# Patient Record
Sex: FEMALE | Race: WHITE | NOT HISPANIC OR LATINO | Employment: FULL TIME | URBAN - METROPOLITAN AREA
[De-identification: names, ages, dates, MRNs, and addresses within clinical notes are randomized per-mention and may not be internally consistent; named-entity substitution may affect disease eponyms.]

---

## 2021-10-22 ENCOUNTER — HOSPITAL ENCOUNTER (EMERGENCY)
Facility: MEDICAL CENTER | Age: 36
End: 2021-10-22
Attending: EMERGENCY MEDICINE
Payer: COMMERCIAL

## 2021-10-22 ENCOUNTER — APPOINTMENT (OUTPATIENT)
Dept: RADIOLOGY | Facility: MEDICAL CENTER | Age: 36
End: 2021-10-22
Attending: EMERGENCY MEDICINE
Payer: COMMERCIAL

## 2021-10-22 VITALS
OXYGEN SATURATION: 96 % | HEART RATE: 91 BPM | SYSTOLIC BLOOD PRESSURE: 152 MMHG | BODY MASS INDEX: 22.66 KG/M2 | HEIGHT: 63 IN | DIASTOLIC BLOOD PRESSURE: 77 MMHG | WEIGHT: 127.87 LBS | RESPIRATION RATE: 18 BRPM | TEMPERATURE: 98.4 F

## 2021-10-22 DIAGNOSIS — Z34.90 EARLY STAGE OF PREGNANCY: ICD-10-CM

## 2021-10-22 DIAGNOSIS — R11.10 VOMITING AND DIARRHEA: ICD-10-CM

## 2021-10-22 DIAGNOSIS — R82.71 ASYMPTOMATIC BACTERIURIA: ICD-10-CM

## 2021-10-22 DIAGNOSIS — R19.7 VOMITING AND DIARRHEA: ICD-10-CM

## 2021-10-22 LAB
ALBUMIN SERPL BCP-MCNC: 5.2 G/DL (ref 3.2–4.9)
ALBUMIN/GLOB SERPL: 1.7 G/DL
ALP SERPL-CCNC: 48 U/L (ref 30–99)
ALT SERPL-CCNC: 18 U/L (ref 2–50)
ANION GAP SERPL CALC-SCNC: 14 MMOL/L (ref 7–16)
APPEARANCE UR: CLEAR
AST SERPL-CCNC: 17 U/L (ref 12–45)
B-HCG SERPL-ACNC: 27.9 MIU/ML (ref 0–5)
BACTERIA #/AREA URNS HPF: ABNORMAL /HPF
BASOPHILS # BLD AUTO: 0.5 % (ref 0–1.8)
BASOPHILS # BLD: 0.06 K/UL (ref 0–0.12)
BILIRUB SERPL-MCNC: 1 MG/DL (ref 0.1–1.5)
BILIRUB UR QL STRIP.AUTO: NEGATIVE
BUN SERPL-MCNC: 10 MG/DL (ref 8–22)
CALCIUM SERPL-MCNC: 10 MG/DL (ref 8.5–10.5)
CHLORIDE SERPL-SCNC: 104 MMOL/L (ref 96–112)
CO2 SERPL-SCNC: 21 MMOL/L (ref 20–33)
COLOR UR: YELLOW
CREAT SERPL-MCNC: 0.58 MG/DL (ref 0.5–1.4)
EOSINOPHIL # BLD AUTO: 0.18 K/UL (ref 0–0.51)
EOSINOPHIL NFR BLD: 1.6 % (ref 0–6.9)
EPI CELLS #/AREA URNS HPF: ABNORMAL /HPF
ERYTHROCYTE [DISTWIDTH] IN BLOOD BY AUTOMATED COUNT: 38.2 FL (ref 35.9–50)
GLOBULIN SER CALC-MCNC: 3.1 G/DL (ref 1.9–3.5)
GLUCOSE SERPL-MCNC: 86 MG/DL (ref 65–99)
GLUCOSE UR STRIP.AUTO-MCNC: NEGATIVE MG/DL
HCG SERPL QL: POSITIVE
HCT VFR BLD AUTO: 43.6 % (ref 37–47)
HGB BLD-MCNC: 15.3 G/DL (ref 12–16)
HYALINE CASTS #/AREA URNS LPF: ABNORMAL /LPF
IMM GRANULOCYTES # BLD AUTO: 0.03 K/UL (ref 0–0.11)
IMM GRANULOCYTES NFR BLD AUTO: 0.3 % (ref 0–0.9)
KETONES UR STRIP.AUTO-MCNC: 40 MG/DL
LEUKOCYTE ESTERASE UR QL STRIP.AUTO: ABNORMAL
LIPASE SERPL-CCNC: 20 U/L (ref 11–82)
LYMPHOCYTES # BLD AUTO: 3.16 K/UL (ref 1–4.8)
LYMPHOCYTES NFR BLD: 27.9 % (ref 22–41)
MCH RBC QN AUTO: 30.6 PG (ref 27–33)
MCHC RBC AUTO-ENTMCNC: 35.1 G/DL (ref 33.6–35)
MCV RBC AUTO: 87.2 FL (ref 81.4–97.8)
MICRO URNS: ABNORMAL
MONOCYTES # BLD AUTO: 0.53 K/UL (ref 0–0.85)
MONOCYTES NFR BLD AUTO: 4.7 % (ref 0–13.4)
NEUTROPHILS # BLD AUTO: 7.35 K/UL (ref 2–7.15)
NEUTROPHILS NFR BLD: 65 % (ref 44–72)
NITRITE UR QL STRIP.AUTO: NEGATIVE
NRBC # BLD AUTO: 0 K/UL
NRBC BLD-RTO: 0 /100 WBC
PH UR STRIP.AUTO: 5 [PH] (ref 5–8)
PLATELET # BLD AUTO: 297 K/UL (ref 164–446)
PMV BLD AUTO: 8.8 FL (ref 9–12.9)
POTASSIUM SERPL-SCNC: 3.4 MMOL/L (ref 3.6–5.5)
PROT SERPL-MCNC: 8.3 G/DL (ref 6–8.2)
PROT UR QL STRIP: NEGATIVE MG/DL
RBC # BLD AUTO: 5 M/UL (ref 4.2–5.4)
RBC # URNS HPF: ABNORMAL /HPF
RBC UR QL AUTO: ABNORMAL
SARS-COV-2 RNA RESP QL NAA+PROBE: NOTDETECTED
SODIUM SERPL-SCNC: 139 MMOL/L (ref 135–145)
SP GR UR STRIP.AUTO: 1.03
SPECIMEN SOURCE: NORMAL
UROBILINOGEN UR STRIP.AUTO-MCNC: 1 MG/DL
WBC # BLD AUTO: 11.3 K/UL (ref 4.8–10.8)
WBC #/AREA URNS HPF: ABNORMAL /HPF

## 2021-10-22 PROCEDURE — U0005 INFEC AGEN DETEC AMPLI PROBE: HCPCS

## 2021-10-22 PROCEDURE — 87086 URINE CULTURE/COLONY COUNT: CPT

## 2021-10-22 PROCEDURE — U0003 INFECTIOUS AGENT DETECTION BY NUCLEIC ACID (DNA OR RNA); SEVERE ACUTE RESPIRATORY SYNDROME CORONAVIRUS 2 (SARS-COV-2) (CORONAVIRUS DISEASE [COVID-19]), AMPLIFIED PROBE TECHNIQUE, MAKING USE OF HIGH THROUGHPUT TECHNOLOGIES AS DESCRIBED BY CMS-2020-01-R: HCPCS

## 2021-10-22 PROCEDURE — 83690 ASSAY OF LIPASE: CPT

## 2021-10-22 PROCEDURE — 84702 CHORIONIC GONADOTROPIN TEST: CPT

## 2021-10-22 PROCEDURE — 81001 URINALYSIS AUTO W/SCOPE: CPT

## 2021-10-22 PROCEDURE — 80053 COMPREHEN METABOLIC PANEL: CPT

## 2021-10-22 PROCEDURE — 84703 CHORIONIC GONADOTROPIN ASSAY: CPT

## 2021-10-22 PROCEDURE — 76801 OB US < 14 WKS SINGLE FETUS: CPT

## 2021-10-22 PROCEDURE — 85025 COMPLETE CBC W/AUTO DIFF WBC: CPT

## 2021-10-22 PROCEDURE — 700111 HCHG RX REV CODE 636 W/ 250 OVERRIDE (IP): Performed by: EMERGENCY MEDICINE

## 2021-10-22 PROCEDURE — 99284 EMERGENCY DEPT VISIT MOD MDM: CPT

## 2021-10-22 PROCEDURE — 700111 HCHG RX REV CODE 636 W/ 250 OVERRIDE (IP)

## 2021-10-22 RX ORDER — NITROFURANTOIN 25; 75 MG/1; MG/1
100 CAPSULE ORAL 2 TIMES DAILY
Qty: 10 CAPSULE | Refills: 0 | Status: SHIPPED | OUTPATIENT
Start: 2021-10-22 | End: 2021-10-27

## 2021-10-22 RX ORDER — ONDANSETRON 4 MG/1
4 TABLET, ORALLY DISINTEGRATING ORAL EVERY 6 HOURS PRN
Qty: 10 TABLET | Refills: 0 | Status: SHIPPED | OUTPATIENT
Start: 2021-10-22 | End: 2022-06-01

## 2021-10-22 RX ORDER — ONDANSETRON 4 MG/1
4 TABLET, ORALLY DISINTEGRATING ORAL ONCE
Status: COMPLETED | OUTPATIENT
Start: 2021-10-22 | End: 2021-10-22

## 2021-10-22 RX ADMIN — ONDANSETRON 4 MG: 4 TABLET, ORALLY DISINTEGRATING ORAL at 08:28

## 2021-10-22 ASSESSMENT — ENCOUNTER SYMPTOMS
BLURRED VISION: 0
HEADACHES: 0
SORE THROAT: 0
SEIZURES: 0
FOCAL WEAKNESS: 0
ABDOMINAL PAIN: 1
BACK PAIN: 0
SHORTNESS OF BREATH: 0
DIARRHEA: 1
COUGH: 0
FEVER: 0
CHILLS: 0
EYE REDNESS: 0
VOMITING: 1
NAUSEA: 1
NECK PAIN: 0

## 2021-10-22 NOTE — DISCHARGE INSTRUCTIONS
You were seen in the Emergency Department for vomiting and diarrhea likely due to viral illness.    Labs were completed without significant acute abnormalities.    Pregnancy test did return positive.  Hormone level is very low which may indicate early pregnancy or possible miscarriage.  Tubal or ectopic pregnancy cannot be ruled out.    Please use 1,000mg of tylenol every 6 hours as needed for pain.  Take nausea medications as directed.    Please return to ER for repeat blood work in 2 days    Return to the Emergency Department with worsening abdominal pain, fevers, persistent vomiting, or other concerns.

## 2021-10-22 NOTE — ED TRIAGE NOTES
Pt amb to triage.  Chief Complaint   Patient presents with   • Nausea/Vomiting/Diarrhea     onset 8pm last night   • Ear Pain     rt ear x 3d   • Abdominal Cramping     Protocol initiated.  Pt given urine collection supplies. Instructed on clean catch technique.

## 2021-10-22 NOTE — ED PROVIDER NOTES
ED Provider Note    CHIEF COMPLAINT  Chief Complaint   Patient presents with   • Nausea/Vomiting/Diarrhea     onset 8pm last night   • Ear Pain     rt ear x 3d   • Abdominal Cramping       HPI  Memo Mckeon is a 35 y.o. female who presents with abdominal pain, vomiting and diarrhea which started acutely last night.  The patient states her 2 children are sick with similar symptoms of vomiting and diarrhea.  She reports multiple episodes of nonbilious, nonbloody emesis as well as multiple episodes of watery diarrhea.  She does have some intermittent abdominal cramping however no significant abdominal pain.  No fevers, nasal congestion, cough.  She does report some pain to the right ear over the last few days.  Patient denies chance of pregnancy.  No dysuria or hematuria.  Last period was the end of September however was shorter than normal.    REVIEW OF SYSTEMS  See HPI for further details.   Review of Systems   Constitutional: Negative for chills and fever.   HENT: Positive for ear pain. Negative for sore throat.    Eyes: Negative for blurred vision and redness.   Respiratory: Negative for cough and shortness of breath.    Cardiovascular: Negative for chest pain and leg swelling.   Gastrointestinal: Positive for abdominal pain, diarrhea, nausea and vomiting.   Genitourinary: Negative for dysuria and urgency.   Musculoskeletal: Negative for back pain and neck pain.   Skin: Negative for rash.   Neurological: Negative for focal weakness, seizures and headaches.   Psychiatric/Behavioral: Negative for suicidal ideas.         PAST MEDICAL HISTORY       SOCIAL HISTORY  Social History     Tobacco Use   • Smoking status: Never Smoker   • Smokeless tobacco: Never Used   Vaping Use   • Vaping Use: Never used   Substance and Sexual Activity   • Alcohol use: Yes   • Drug use: Never   • Sexual activity: Not on file       SURGICAL HISTORY  patient denies any surgical history    CURRENT MEDICATIONS  Home Medications     Reviewed by  "Cachorro Clay R.N. (Registered Nurse) on 10/22/21 at 0808  Med List Status: Partial   Medication Last Dose Status        Patient Niranjan Taking any Medications                       ALLERGIES  No Known Allergies    PHYSICAL EXAM   VITAL SIGNS: /77   Pulse 91   Temp 36.9 °C (98.4 °F) (Temporal)   Resp 18   Ht 1.6 m (5' 3\")   Wt 58 kg (127 lb 13.9 oz)   LMP 09/30/2021   SpO2 96%   BMI 22.65 kg/m²      Physical Exam  Constitutional:       General: She is not in acute distress.     Comments: Nontoxic-appearing young female   HENT:      Head: Normocephalic and atraumatic.   Eyes:      Conjunctiva/sclera: Conjunctivae normal.      Pupils: Pupils are equal, round, and reactive to light.   Cardiovascular:      Rate and Rhythm: Normal rate and regular rhythm.      Heart sounds: Normal heart sounds.   Pulmonary:      Effort: Pulmonary effort is normal. No respiratory distress.      Breath sounds: Normal breath sounds.   Abdominal:      General: There is no distension.      Palpations: Abdomen is soft.      Tenderness: There is abdominal tenderness.      Comments: Mild bilateral lower quadrant tenderness to palpation.  No rebound or guarding.   Musculoskeletal:         General: No tenderness. Normal range of motion.      Cervical back: Normal range of motion and neck supple.   Skin:     General: Skin is warm and dry.   Neurological:      Mental Status: She is alert and oriented to person, place, and time.      Comments: Moving all extremities spontaneously   Psychiatric:         Mood and Affect: Affect normal.           DIAGNOSTIC STUDIES    LABS  Personally reviewed by me  Labs Reviewed   CBC WITH DIFFERENTIAL - Abnormal; Notable for the following components:       Result Value    WBC 11.3 (*)     MCHC 35.1 (*)     MPV 8.8 (*)     Neutrophils (Absolute) 7.35 (*)     All other components within normal limits   COMP METABOLIC PANEL - Abnormal; Notable for the following components:    Potassium 3.4 (*)     " Albumin 5.2 (*)     Total Protein 8.3 (*)     All other components within normal limits   HCG QUAL SERUM - Abnormal; Notable for the following components:    Beta-Hcg Qualitative Serum Positive (*)     All other components within normal limits   URINALYSIS,CULTURE IF INDICATED - Abnormal; Notable for the following components:    Ketones 40 (*)     Leukocyte Esterase Trace (*)     Occult Blood Moderate (*)     All other components within normal limits    Narrative:     Indication for culture:->Patient WITHOUT an indwelling Luo  catheter in place with new onset of Dysuria, Frequency,  Urgency, and/or Suprapubic pain   URINE MICROSCOPIC (W/UA) - Abnormal; Notable for the following components:    WBC 10-20 (*)     RBC 5-10 (*)     Bacteria Few (*)     Hyaline Cast 6-10 (*)     All other components within normal limits    Narrative:     Indication for culture:->Patient WITHOUT an indwelling Luo  catheter in place with new onset of Dysuria, Frequency,  Urgency, and/or Suprapubic pain   HCG QUANTITATIVE - Abnormal; Notable for the following components:    Bhcg 27.9 (*)     All other components within normal limits   LIPASE   ESTIMATED GFR   URINE CULTURE(NEW)    Narrative:     Indication for culture:->Patient WITHOUT an indwelling Luo  catheter in place with new onset of Dysuria, Frequency,  Urgency, and/or Suprapubic pain   SARS-COV-2, PCR (IN-HOUSE)    Narrative:     Have you been in close contact with a person who is suspected  or known to be positive for COVID-19 within the last 30 days  (e.g. last seen that person < 30 days ago)->Unknown           RADIOLOGY  Personally reviewed by me  US-OB 1ST TRIMESTER WITH TRANSVAGINAL (COMBO)   Final Result         An intrauterine gestational sac is not expected to be visualized with a beta-hCG of this level. Differential considerations include early pregnancy or spontaneous . Ectopic pregnancy cannot be excluded.      Continued follow-up imaging and serial beta-hCG  is recommended.            ED COURSE  Vitals:    10/22/21 0803 10/22/21 1116 10/22/21 1130 10/22/21 1332   BP:  114/76 123/75 152/77   Pulse:  98 75 91   Resp:  16 17 18   Temp:    36.9 °C (98.4 °F)   TempSrc:    Temporal   SpO2:  99% 96% 96%   Weight: 58 kg (127 lb 13.9 oz)      Height:             Medications administered:  Medications   ondansetron (ZOFRAN ODT) dispertab 4 mg (4 mg Oral Given 10/22/21 0828)         MEDICAL DECISION MAKING  Patient presents with acute onset of vomiting diarrhea and generalized abdominal pain today.  She is afebrile with reassuring vital signs on arrival and nontoxic-appearing.  Her abdominal exam is benign without concern for surgical process such as appendicitis or diverticulitis.  Labs are reassuring other than mild leukocytosis.  She does have family at home with similar symptoms therefore I feel that this is likely due to viral gastroenteritis.  The patient's pregnancy test however did return positive.  Her quant was very low likely indicative of early pregnancy as she has not had any recent bleeding since her last period.  Ultrasound does not show evidence of intrauterine pregnancy.  Symptoms are unlikely due to ectopic pregnancy however we will plan to have the patient return in 48 hours for quant recheck.  Will discharge home with symptomatic treatment for viral gastroenteritis in the meantime.    Upon reassessment, patient is resting comfortably with normal vital signs.  No new complaints at this time.  She is tolerating water without difficulty.  Discussed results with patient and/or family as well as importance of primary care follow up.  Return to ER in 48 hours for quant recheck.  Patient understands plan of care and strict return precautions for new or changing symptoms.         IMPRESSION  (R11.10,  R19.7) Vomiting and diarrhea  (Z34.90) Early stage of pregnancy  (R82.71) Asymptomatic bacteriuria    Disposition: Discharge home, stable condition  Results, diagnoses,  and treatment options were discussed with the patient and/or family. Patient verbalized understanding of plan of care.    Patient referred to primary care provider for monitoring and treatment of blood pressure.      Discharge Medication List as of 10/22/2021  1:34 PM      START taking these medications    Details   ondansetron (ZOFRAN ODT) 4 MG TABLET DISPERSIBLE Take 1 Tablet by mouth every 6 hours as needed for Nausea., Disp-10 Tablet, R-0, Normal      nitrofurantoin (MACROBID) 100 MG Cap Take 1 Capsule by mouth 2 times a day for 5 days., Disp-10 Capsule, R-0, Normal                 Electronically signed by: Su Pereira M.D., 10/22/2021 11:50 AM

## 2021-10-24 ENCOUNTER — HOSPITAL ENCOUNTER (EMERGENCY)
Facility: MEDICAL CENTER | Age: 36
End: 2021-10-24
Attending: EMERGENCY MEDICINE
Payer: COMMERCIAL

## 2021-10-24 VITALS
RESPIRATION RATE: 16 BRPM | DIASTOLIC BLOOD PRESSURE: 69 MMHG | BODY MASS INDEX: 23.24 KG/M2 | WEIGHT: 131.17 LBS | OXYGEN SATURATION: 97 % | HEART RATE: 95 BPM | TEMPERATURE: 98.6 F | HEIGHT: 63 IN | SYSTOLIC BLOOD PRESSURE: 135 MMHG

## 2021-10-24 DIAGNOSIS — Z3A.01 LESS THAN 8 WEEKS GESTATION OF PREGNANCY: ICD-10-CM

## 2021-10-24 LAB
B-HCG SERPL-ACNC: 106 MIU/ML (ref 0–5)
BACTERIA UR CULT: NORMAL
SIGNIFICANT IND 70042: NORMAL
SITE SITE: NORMAL
SOURCE SOURCE: NORMAL

## 2021-10-24 PROCEDURE — 84702 CHORIONIC GONADOTROPIN TEST: CPT

## 2021-10-24 PROCEDURE — 99283 EMERGENCY DEPT VISIT LOW MDM: CPT

## 2021-10-24 ASSESSMENT — LIFESTYLE VARIABLES
DOES PATIENT WANT TO STOP DRINKING: NO
DO YOU DRINK ALCOHOL: NO

## 2021-10-24 ASSESSMENT — FIBROSIS 4 INDEX: FIB4 SCORE: 0.46

## 2021-10-24 NOTE — ED PROVIDER NOTES
"ED Provider Note    Scribed for Lonnie Nicole M.D. by Josue Cortez-Reyes. 10/24/2021, 9:10 AM.    Primary care provider: Pcp Pt States None  Means of arrival: Walk-in  History obtained from: Patient  History limited by: None    CHIEF COMPLAINT  Chief Complaint   Patient presents with    Other       HPI  Memo Mckeon is a 35 y.o. female who presents to the Emergency Department for a repeat HCG. The patient states that she was evaluated here in the ED 2 days ago for abdominal pain, vomiting, and diarrhea and was found to be pregnant following an ultrasound. She notes that she still has diarrhea but her abdominal pain has resolved. She states that she was advised to present for a repeat HCG as her initial level was low at 27.9. The patient states that her last menstrual period was at the end of last month. She also notes that she was placed on antibiotics during her last visit to treat a UTI.    REVIEW OF SYSTEMS  As above otherwise all other systems are negative    PAST MEDICAL HISTORY   has a past medical history of GERD (gastroesophageal reflux disease).    SURGICAL HISTORY  patient denies any surgical history    SOCIAL HISTORY  Social History     Tobacco Use    Smoking status: Never Smoker    Smokeless tobacco: Never Used   Vaping Use    Vaping Use: Never used   Substance Use Topics    Alcohol use: Not Currently    Drug use: Never      Social History     Substance and Sexual Activity   Drug Use Never       FAMILY HISTORY  History reviewed. No pertinent family history.    CURRENT MEDICATIONS  Current Outpatient Medications   Medication Instructions    nitrofurantoin (MACROBID) 100 mg, Oral, 2 TIMES DAILY    ondansetron (ZOFRAN ODT) 4 mg, Oral, EVERY 6 HOURS PRN       ALLERGIES  No Known Allergies    PHYSICAL EXAM  VITAL SIGNS: /81   Pulse (!) 110   Temp 36.2 °C (97.1 °F) (Temporal)   Resp 16   Ht 1.6 m (5' 3\")   Wt 59.5 kg (131 lb 2.8 oz)   LMP 09/30/2021   SpO2 98%   BMI 23.24 kg/m² "     Constitutional: Well developed, Well nourished, No acute distress, Non-toxic appearance.   HENT: Normocephalic, Atraumatic, Bilateral external ears normal, oropharynx moist, No oral exudates, Nose normal.   Eyes:conjunctiva is normal, there are no signs of exudate.   Neck: Supple, no meningeal signs.  Lymphatic: No lymphadenopathy noted.   Cardiovascular: Regular rate and rhythm without murmurs gallops or rubs.   Thorax & Lungs: No respiratory distress. Breathing comfortably. Lungs are clear to auscultation bilaterally, there are no wheezes no rales. Chest wall is nontender.  Abdomen: Soft, nontender, nondistended. Bowel sounds are present.   Skin: Warm, Dry, No erythema,   Back: No tenderness, No CVA tenderness.  Musculoskeletal: Good range of motion in all major joints. No tenderness to palpation or major deformities noted. Intact distal pulses, no clubbing, no cyanosis, no edema,   Neurologic: Alert & oriented x 3, Moving all extremities. No gross abnormalities.    Psychiatric: Affect normal, Judgment normal, Mood normal.     LABS  Results for orders placed or performed during the hospital encounter of 10/24/21   BETA-HCG QUANTITATIVE SERUM   Result Value Ref Range    Bhcg 106.0 (H) 0.0 - 5.0 mIU/mL       All labs reviewed by me.  COURSE & MEDICAL DECISION MAKING  Pertinent Labs & Imaging studies reviewed. (See chart for details)    9:10 AM - Patient seen and examined at bedside. Ordered Beta-HCG Quantitative to evaluate her symptoms. I informed the patient that her initial Beta- HCG was low and that today's lab would be to see if her Beta-HCG is increasing or decreasing. I informed her that it is normal for this value to be low this early in her pregnancy and an increase in her levels today is consistent with a normal pregnancy. Patient verbalizes understanding and agreement to this plan of care.     9:47 AM - I discussed the patient's case and the above findings with Dr. Gutierrez (OBGYN) who advised that the  patient follow up in two weeks if her bHCG levels doubled and within 1 week if her levels decreased.    10:56 AM - I reevaluated the patient at bedside. I informed her that her Bhcg level was 106 and informed her that this is consistent with a normal pregnancy. I advised her to follow up with OBGYN in 2 weeks. I discussed plan for discharge and follow up as outlined below. The patient verbalizes they feel comfortable going home. The patient is stable for discharge at this time and will return for any new or worsening symptoms. Patient verbalizes understanding and support with my plan for discharge.     Decision Making:  She presents for evaluation.  Clinically she states that she is feeling better she has no abdominal tenderness she is just a little bit queasy.  My suspicion is is that she had a viral gastroenteritis and it was coincidental finding that she was pregnant with a beta-hCG quant of 27.  Today her quant is over 100.  It is more than doubled at this point I feels most likely a normal pregnancy I did speak with Dr. Gutierrez who recommended for the patient to follow-up in 2 weeks for outpatient follow-up and for establishment of care.  The patient should return if there is any increasing abdominal pain vaginal bleeding or as necessary.    The patient will return for new or worsening symptoms and is stable at the time of discharge.    DISPOSITION:  Patient will be discharged home in stable condition.    FOLLOW UP:  Whitley Gutierrez M.D.  [address] [place] [id number]  [phone]    Schedule an appointment as soon as possible for a visit in 2 weeks  For re-check, Return if any symptoms worsen      FINAL IMPRESSION  1. Less than 8 weeks gestation of pregnancy          I, Josue Cortez-Reyes (Kristi), am scribing for, and in the presence of, Lonnie Nicole M.D..    Electronically signed by: Josue Cortez-Reyes (Scribe), 10/24/2021    Lonnie HEWITT M.D. personally performed the services  described in this documentation, as scribed by Josue Cortez-Reyes in my presence, and it is both accurate and complete. C.    The note accurately reflects work and decisions made by me.  Lonnie Nicole M.D.  10/24/2021  11:03 AM

## 2021-10-24 NOTE — ED TRIAGE NOTES
Chief Complaint   Patient presents with   • Other     Pt was seen in the ED on Friday and was told to return for repeat HCG today. VS NAD

## 2022-03-13 ENCOUNTER — HOSPITAL ENCOUNTER (EMERGENCY)
Facility: MEDICAL CENTER | Age: 37
End: 2022-03-13
Attending: OBSTETRICS & GYNECOLOGY | Admitting: OBSTETRICS & GYNECOLOGY
Payer: COMMERCIAL

## 2022-03-13 VITALS
HEIGHT: 63 IN | TEMPERATURE: 97.8 F | BODY MASS INDEX: 24.8 KG/M2 | WEIGHT: 140 LBS | RESPIRATION RATE: 16 BRPM | DIASTOLIC BLOOD PRESSURE: 75 MMHG | HEART RATE: 110 BPM | SYSTOLIC BLOOD PRESSURE: 124 MMHG

## 2022-03-13 LAB
ABO GROUP BLD: NORMAL
APPEARANCE UR: CLEAR
BASOPHILS # BLD AUTO: 0.3 % (ref 0–1.8)
BASOPHILS # BLD: 0.04 K/UL (ref 0–0.12)
BLD GP AB SCN SERPL QL: NORMAL
COLOR UR AUTO: YELLOW
EOSINOPHIL # BLD AUTO: 0.03 K/UL (ref 0–0.51)
EOSINOPHIL NFR BLD: 0.2 % (ref 0–6.9)
ERYTHROCYTE [DISTWIDTH] IN BLOOD BY AUTOMATED COUNT: 43 FL (ref 35.9–50)
GLUCOSE UR QL STRIP.AUTO: NEGATIVE MG/DL
HBV SURFACE AG SER QL: ABNORMAL
HCT VFR BLD AUTO: 38.1 % (ref 37–47)
HCV AB SER QL: ABNORMAL
HGB BLD-MCNC: 12.5 G/DL (ref 12–16)
HIV 1+2 AB+HIV1 P24 AG SERPL QL IA: NORMAL
IMM GRANULOCYTES # BLD AUTO: 0.14 K/UL (ref 0–0.11)
IMM GRANULOCYTES NFR BLD AUTO: 1 % (ref 0–0.9)
KETONES UR QL STRIP.AUTO: >=160 MG/DL
LEUKOCYTE ESTERASE UR QL STRIP.AUTO: NEGATIVE
LYMPHOCYTES # BLD AUTO: 0.73 K/UL (ref 1–4.8)
LYMPHOCYTES NFR BLD: 5 % (ref 22–41)
MCH RBC QN AUTO: 30.3 PG (ref 27–33)
MCHC RBC AUTO-ENTMCNC: 32.8 G/DL (ref 33.6–35)
MCV RBC AUTO: 92.3 FL (ref 81.4–97.8)
MONOCYTES # BLD AUTO: 0.36 K/UL (ref 0–0.85)
MONOCYTES NFR BLD AUTO: 2.4 % (ref 0–13.4)
NEUTROPHILS # BLD AUTO: 13.4 K/UL (ref 2–7.15)
NEUTROPHILS NFR BLD: 91.1 % (ref 44–72)
NITRITE UR QL STRIP.AUTO: NEGATIVE
NRBC # BLD AUTO: 0 K/UL
NRBC BLD-RTO: 0 /100 WBC
PH UR STRIP.AUTO: 5.5 [PH] (ref 5–8)
PLATELET # BLD AUTO: 224 K/UL (ref 164–446)
PMV BLD AUTO: 8.9 FL (ref 9–12.9)
PROT UR QL STRIP: NEGATIVE MG/DL
RBC # BLD AUTO: 4.13 M/UL (ref 4.2–5.4)
RBC UR QL AUTO: ABNORMAL
RH BLD: NORMAL
RUBV AB SER QL: >500 IU/ML
SP GR UR STRIP.AUTO: 1.02 (ref 1–1.03)
T PALLIDUM AB SER QL IA: ABNORMAL
WBC # BLD AUTO: 14.7 K/UL (ref 4.8–10.8)

## 2022-03-13 PROCEDURE — 87389 HIV-1 AG W/HIV-1&-2 AB AG IA: CPT

## 2022-03-13 PROCEDURE — 86803 HEPATITIS C AB TEST: CPT

## 2022-03-13 PROCEDURE — 86900 BLOOD TYPING SEROLOGIC ABO: CPT

## 2022-03-13 PROCEDURE — 86901 BLOOD TYPING SEROLOGIC RH(D): CPT

## 2022-03-13 PROCEDURE — 700105 HCHG RX REV CODE 258: Performed by: STUDENT IN AN ORGANIZED HEALTH CARE EDUCATION/TRAINING PROGRAM

## 2022-03-13 PROCEDURE — 36415 COLL VENOUS BLD VENIPUNCTURE: CPT

## 2022-03-13 PROCEDURE — 81002 URINALYSIS NONAUTO W/O SCOPE: CPT

## 2022-03-13 PROCEDURE — 86780 TREPONEMA PALLIDUM: CPT | Mod: 91

## 2022-03-13 PROCEDURE — 86850 RBC ANTIBODY SCREEN: CPT

## 2022-03-13 PROCEDURE — 302449 STATCHG TRIAGE ONLY (STATISTIC)

## 2022-03-13 PROCEDURE — 86762 RUBELLA ANTIBODY: CPT

## 2022-03-13 PROCEDURE — 87340 HEPATITIS B SURFACE AG IA: CPT

## 2022-03-13 PROCEDURE — 86592 SYPHILIS TEST NON-TREP QUAL: CPT

## 2022-03-13 PROCEDURE — 85025 COMPLETE CBC W/AUTO DIFF WBC: CPT

## 2022-03-13 RX ORDER — SODIUM CHLORIDE, SODIUM LACTATE, POTASSIUM CHLORIDE, CALCIUM CHLORIDE 600; 310; 30; 20 MG/100ML; MG/100ML; MG/100ML; MG/100ML
INJECTION, SOLUTION INTRAVENOUS ONCE
Status: COMPLETED | OUTPATIENT
Start: 2022-03-13 | End: 2022-03-13

## 2022-03-13 RX ADMIN — SODIUM CHLORIDE, POTASSIUM CHLORIDE, SODIUM LACTATE AND CALCIUM CHLORIDE: 600; 310; 30; 20 INJECTION, SOLUTION INTRAVENOUS at 16:00

## 2022-03-13 ASSESSMENT — FIBROSIS 4 INDEX: FIB4 SCORE: 0.47

## 2022-03-13 NOTE — ED PROVIDER NOTES
"S: Pt is a 36 y.o.  EDC unknown but reported as 24 weeks based on ultrasound/beta-hCG at ED in October.  who presents to triage c/o abdominal cramping.  Patient reports that for the past 2 days she has been having diarrhea.  Right.  Due to this she was having constipation and having abdominal pain during that as well.  She states that 2 days ago she started having diarrhea.  She experiences the pain sharp rhythmic pain that builds up above her uterus. It passes quickly dissipates.  She has not had any appetite today and one episode of nausea with emesis yesterday.     Denies VB, RUCs, LOF.  Reports good FM.      O: /75   Pulse (!) 110   Temp 36.6 °C (97.8 °F) (Temporal)   Resp 16   Ht 1.6 m (5' 3\")   Wt 63.5 kg (140 lb)   LMP 2021   BMI 24.80 kg/m²         Fetal heart tones present      [place]: No UCs        Physical exam:  GEN: NAD resting in bed on her side.  CV regular rate and rhythm S1-S2  Pulm: CTA BL, no wheezing rhonchi or rales  ABD: Soft gravid nontender nondistended.  Positive bowel sounds all 4 quadrants  Ext: No edema noted peripheral pulses 2+        A/P  There are no problems to display for this patient.    36-year-old  AB recorded 24 weeks pregnancy complicated by no prenatal care.    Here with likely gastroenteritis, diarrhea.  Recommend the patient with oral hydration as well as eating simple diet.  Due to no prenatal care will get the following    1.  IUP @ estimated 24 weeks by reported  2.  Fetal heart tones present  3.  NO UC on toco  4.  Will get Prenatal Labs today.  5.  Patient to establish care with pregnancy center will be given all information prior to discharge.      Return precautions discussed with mom including loss of fluid, vaginal bleeding, decreased fetal movement as well as regular rhythmic contractions.  Patient verbalized understanding return precautions discharge plan and is agreeable.      Ozzie York MD,   PGY1 Family Medicine        "

## 2022-03-14 LAB — T PALLIDUM AB SER QL IA: NORMAL

## 2022-03-29 ENCOUNTER — GYNECOLOGY VISIT (OUTPATIENT)
Dept: OBGYN | Facility: CLINIC | Age: 37
End: 2022-03-29
Payer: COMMERCIAL

## 2022-03-29 ENCOUNTER — HOSPITAL ENCOUNTER (OUTPATIENT)
Facility: MEDICAL CENTER | Age: 37
End: 2022-03-29
Attending: OBSTETRICS & GYNECOLOGY
Payer: MEDICAID

## 2022-03-29 VITALS — HEIGHT: 63 IN | WEIGHT: 147.2 LBS | BODY MASS INDEX: 26.08 KG/M2

## 2022-03-29 DIAGNOSIS — Z34.92 SECOND TRIMESTER PREGNANCY: ICD-10-CM

## 2022-03-29 PROCEDURE — 87591 N.GONORRHOEAE DNA AMP PROB: CPT

## 2022-03-29 PROCEDURE — 87491 CHLMYD TRACH DNA AMP PROBE: CPT

## 2022-03-29 PROCEDURE — 90040 PR PRENATAL FOLLOW UP: CPT | Performed by: OBSTETRICS & GYNECOLOGY

## 2022-03-29 ASSESSMENT — FIBROSIS 4 INDEX: FIB4 SCORE: 0.63

## 2022-03-29 NOTE — PROGRESS NOTES
CC: New Ob visit    Subjective Ms. Memo Mckeon   LMP1, giving TRISTA of 22 and is currently 25w4d,  has not been confirmed with US yet. Presents for prenatal care. Today is her first prenatal visit at [facility]. She denies any contractions/cramping, leakage of fluid, vaginal bleeding.     I have reviewed the patients' medical, surgical, gynecological, obstetrical, social, and family histories, medications and available lab results and pertinent notes are as follows:    OB History    Para Term  AB Living   4 3 3 0 0 3   SAB IAB Ectopic Molar Multiple Live Births   0 0 0 0 0 3       Past Gynecological History:    Denies fibroids, ovarian cysts, abnormal pap smears, STDs. She denies ever having surgery on her cervix, uterus, or ovaries in the past. Last pap smear was , WNL    Past Medical History:   Diagnosis Date   • GERD (gastroesophageal reflux disease)        History reviewed. No pertinent surgical history.    Social History     Socioeconomic History   • Marital status:      Spouse name: Not on file   • Number of children: Not on file   • Years of education: Not on file   • Highest education level: Not on file   Occupational History   • Not on file   Tobacco Use   • Smoking status: Never Smoker   • Smokeless tobacco: Never Used   Vaping Use   • Vaping Use: Never used   Substance and Sexual Activity   • Alcohol use: Not Currently   • Drug use: Never   • Sexual activity: Yes     Partners: Male     Comment: Unplanned pregnancy but baby is welcomed. FOB is involved and supportive.   Other Topics Concern   • Not on file   Social History Narrative   • Not on file     Social Determinants of Health     Financial Resource Strain: Not on file   Food Insecurity: Not on file   Transportation Needs: Not on file   Physical Activity: Not on file   Stress: Not on file   Social Connections: Not on file   Intimate Partner Violence: Not on file   Housing Stability:  "Not on file       Family History: History reviewed. No pertinent family history.        Infection Prevention         Allergies: Patient has no known allergies.    Objective:Ht 1.6 m (5' 3\")   Wt 66.8 kg (147 lb 3.2 oz)     Objective: There were no vitals filed for this visit.    Prenatal Physical:  General Exam:  HEENT: normal    Heart: normal    Thyroid: normal    Lungs: normal    Lymph Nodes: normal    Breasts: normal    Neurological: normal    Abdomen: normal    Skin: normal    Extremities: normal    Pelvic Exam:  Vulva:  Normal  Vagina:  Normal  Cervix:  Normal  Adnexa:  Normal  Rectum:  Normal      Lab:   Recent Results (from the past 672 hour(s))   POCT urinalysis device results    Collection Time: 03/13/22  1:40 PM   Result Value Ref Range    POC Color Yellow     POC Appearance Clear     POC Glucose Negative Negative mg/dL    POC Ketones >=160 (A) Negative mg/dL    POC Specific Gravity 1.025 1.005 - 1.030    POC Blood Moderate (A) Negative    POC Urine PH 5.5 5.0 - 8.0    POC Protein Negative Negative mg/dL    POC Nitrites Negative Negative    POC Leukocyte Esterase Negative Negative   PRENATAL PANEL 3+HIV+HCV    Collection Time: 03/13/22  4:27 PM   Result Value Ref Range    WBC 14.7 (H) 4.8 - 10.8 K/uL    RBC 4.13 (L) 4.20 - 5.40 M/uL    Hemoglobin 12.5 12.0 - 16.0 g/dL    Hematocrit 38.1 37.0 - 47.0 %    MCV 92.3 81.4 - 97.8 fL    MCH 30.3 27.0 - 33.0 pg    MCHC 32.8 (L) 33.6 - 35.0 g/dL    RDW 43.0 35.9 - 50.0 fL    Platelet Count 224 164 - 446 K/uL    MPV 8.9 (L) 9.0 - 12.9 fL    Neutrophils-Polys 91.10 (H) 44.00 - 72.00 %    Lymphocytes 5.00 (L) 22.00 - 41.00 %    Monocytes 2.40 0.00 - 13.40 %    Eosinophils 0.20 0.00 - 6.90 %    Basophils 0.30 0.00 - 1.80 %    Immature Granulocytes 1.00 (H) 0.00 - 0.90 %    Nucleated RBC 0.00 /100 WBC    Neutrophils (Absolute) 13.40 (H) 2.00 - 7.15 K/uL    Lymphs (Absolute) 0.73 (L) 1.00 - 4.80 K/uL    Monos (Absolute) 0.36 0.00 - 0.85 K/uL    Eos (Absolute) 0.03 0.00 " - 0.51 K/uL    Baso (Absolute) 0.04 0.00 - 0.12 K/uL    Immature Granulocytes (abs) 0.14 (H) 0.00 - 0.11 K/uL    NRBC (Absolute) 0.00 K/uL    Rubella IgG Antibody >500 IU/mL    Hepatitis B Surface Antigen Non-Reactive Non-Reactive    Hepatitis C Antibody Non-Reactive Non-Reactive    Syphilis, Treponemal Qual Non-Reactive Non-Reactive   T.PALLIDUM AB EIA    Collection Time: 03/13/22  4:27 PM   Result Value Ref Range    Syphilis, Treponemal Qual Non-Reactive Non-Reactive   HIV AG/AB COMBO ASSAY SCREENING    Collection Time: 03/13/22  4:27 PM   Result Value Ref Range    HIV Ag/Ab Combo Assay Non-Reactive Non Reactive   OP Prenatal Panel-Blood Bank    Collection Time: 03/13/22  4:27 PM   Result Value Ref Range    ABO Grouping Only A     Rh Grouping Only POS     Antibody Screen Scrn NEG        Ultrasound: Reviewed initial scan and TRISTA is by LMP giving TRISTA 7/8/22 and patient is currently 25w4d     Assessment:    --Normal Exam at 25 weeks    -- dating needs to be confirmed with ultrasound, anatomy ordered today    --has already done prenatal labs through the hospital, glucola ordered today.     Plan:    Reviewed the patients risk factors for this pregnancy and recommend the need for prenatal care    Genetic screening was discussed with literature on Prenatal Screening, Cystic Fibrosis, and SMA provided and the patient plans to decline    Discuss importance of water intake, controlled caloric intake, eating 5 small meals throughout the day to maintain blood sugar and taking PNV    Discuss importance of exercise, as well as rest    Lab slip for Prenatal labs (if not already completed)    Discuss policy for OB care at [facility]     Had pap done 2 years ago at [facility]    Follow up in 4 weeks for next visit

## 2022-03-30 LAB
C TRACH DNA GENITAL QL NAA+PROBE: NEGATIVE
N GONORRHOEA DNA GENITAL QL NAA+PROBE: NEGATIVE
SPECIMEN SOURCE: NORMAL

## 2022-03-31 RX ORDER — NIZATIDINE 150 MG/1
150 CAPSULE ORAL NIGHTLY
Qty: 30 CAPSULE | Refills: 1 | Status: SHIPPED | OUTPATIENT
Start: 2022-03-31 | End: 2022-04-30

## 2022-04-08 ENCOUNTER — HOSPITAL ENCOUNTER (OUTPATIENT)
Dept: RADIOLOGY | Facility: MEDICAL CENTER | Age: 37
End: 2022-04-08
Attending: OBSTETRICS & GYNECOLOGY
Payer: MEDICAID

## 2022-04-08 DIAGNOSIS — Z34.92 SECOND TRIMESTER PREGNANCY: ICD-10-CM

## 2022-04-08 PROCEDURE — 76805 OB US >/= 14 WKS SNGL FETUS: CPT

## 2022-05-25 ENCOUNTER — ROUTINE PRENATAL (OUTPATIENT)
Dept: OBGYN | Facility: CLINIC | Age: 37
End: 2022-05-25
Payer: MEDICAID

## 2022-05-25 VITALS — SYSTOLIC BLOOD PRESSURE: 100 MMHG | BODY MASS INDEX: 27.63 KG/M2 | WEIGHT: 156 LBS | DIASTOLIC BLOOD PRESSURE: 60 MMHG

## 2022-05-25 DIAGNOSIS — O09.523 MULTIGRAVIDA OF ADVANCED MATERNAL AGE IN THIRD TRIMESTER: ICD-10-CM

## 2022-05-25 DIAGNOSIS — O09.33 INSUFFICIENT PRENATAL CARE IN THIRD TRIMESTER: ICD-10-CM

## 2022-05-25 PROCEDURE — 90040 PR PRENATAL FOLLOW UP: CPT | Performed by: OBSTETRICS & GYNECOLOGY

## 2022-05-25 PROCEDURE — 90715 TDAP VACCINE 7 YRS/> IM: CPT | Performed by: OBSTETRICS & GYNECOLOGY

## 2022-05-25 PROCEDURE — 90471 IMMUNIZATION ADMIN: CPT | Performed by: OBSTETRICS & GYNECOLOGY

## 2022-05-25 ASSESSMENT — FIBROSIS 4 INDEX: FIB4 SCORE: 0.63

## 2022-05-25 NOTE — PROGRESS NOTES
OB Followup;    33w6d    There are no problems to display for this patient.      Vitals:    22 1059   BP: 100/60   Weight: 70.8 kg (156 lb)       Patient presents for followup of OB care. Currently doing well . Good fetal movement no leakage of fluid no contractions or vaginal bleeding        Size equals dates, normal fetal heart rate        Very late entry to PNC    Labs; prenatal labs normal-patient does not perform 1 hour GTT yet-very late presentation for prenatal care today is her first visit.  Ultrasound shows dolichocephaly-ultrasound was performed back in April and patient was given the option to have MFM evaluate CNS anatomy but patient has declined.     Given another lab slip for 1 hour GTT today    Tdap today      Labor precautions given  Discussed proper weight gain during pregnancy.    Signs and symptoms of labor/ labor discussed   Discussed our group's policy on prenatal checkups and delivery  SMFM/ACOG/CDC recommend Covid vaccination for pregnant women-Covid infection during pregnancy results and worse maternal outcomes including greater need for mechanical ventilation and ICU admission and worse fetal outcomes including; possible FGR, increased risk of stillbirth  labor/delivery.  Discussed proper exercise during pregnancy  Discussed proper oral fluid hydration  Reviewed fetal kick counts and appropriate fetal movement during pregnancy  Reviewed postpartum birth control methods  All questions answered in detail    Start weekly NSTs at 34 weeks secondary to history of AMA    Followup in  1 weeks

## 2022-05-25 NOTE — PROGRESS NOTES
OB follow up   + fetal movement.  No VB, LOF or UC's.  Phone # [phone]  Preferred pharmacy confirmed.  PNP done.  US done 4/8/22  1 gtt not done yet  Kick count sheet given today.  TDap today  BTL declined

## 2022-05-25 NOTE — LETTER
"Count Your Baby's Movements  Another step to a healthy delivery    Memo Romannett             Dept: [phone]    How Many Weeks Pregnant=33w5d    Date to Begin Countin22              How to use this chart    One way for your physician to keep track of your baby's health is by knowing how often the baby moves (or \"kicks\") in your womb.  You can help your physician to do this by using this chart every day.    Every day, you should see how many hours it takes for your baby to move 10 times.  Start in the morning, as soon as you get up.    · First, write down the time your baby moves until you get to 10.  · Check off one box every time your baby moves until you get to 10.  · Write down the time you finished counting in the last column.  · Total how long it took to count up all 10 movements.  · Finally, fill in the box that shows how long this took.  After counting 10 movements, you no longer have to count any more that day.  The next morning, just start counting again as soon as you get up.    What should you call a \"movement\"?  It is hard to say, because it will feel different from one mother to another and from one pregnancy to the next.  The important thing is that you count the movements the same way throughout your pregnancy.  If you have more questions, you should ask your physician.    Count carefully every day!  SAMPLE:  Week 28    How many hours did it take to feel 10 movements?       Start  Time     1     2     3     4     5     6     7     8     9     10   Finish Time   Mon 8:20 ·  ·  ·  ·  ·  ·  ·  ·  ·  ·  11:40                  Sat               Sun                 IMPORTANT: You should contact your physician if it takes more than two hours for you to feel 10 movements.  Each morning, write down the time and start to count the movements of your baby.  Keep track by checking off one box every time you feel one movement.  When you have felt " "10 \"kicks\", write down the time you finished counting in the last column.  Then fill in the   box (over the check marta) for the number of hours it took.  Be sure to read the complete instructions on the previous page.            "

## 2022-06-01 ENCOUNTER — NON-PROVIDER VISIT (OUTPATIENT)
Dept: OBGYN | Facility: CLINIC | Age: 37
End: 2022-06-01
Payer: MEDICAID

## 2022-06-01 ENCOUNTER — ROUTINE PRENATAL (OUTPATIENT)
Dept: OBGYN | Facility: CLINIC | Age: 37
End: 2022-06-01
Payer: MEDICAID

## 2022-06-01 VITALS — BODY MASS INDEX: 27.97 KG/M2 | SYSTOLIC BLOOD PRESSURE: 109 MMHG | DIASTOLIC BLOOD PRESSURE: 67 MMHG | WEIGHT: 157.9 LBS

## 2022-06-01 DIAGNOSIS — O09.523 MULTIGRAVIDA OF ADVANCED MATERNAL AGE IN THIRD TRIMESTER: ICD-10-CM

## 2022-06-01 LAB
NST ACOUSTIC STIMULATION: NORMAL
NST ACTION NECESSARY: NORMAL
NST ASSESSMENT: NORMAL
NST BASELINE: NORMAL
NST INDICATIONS: NORMAL
NST OTHER DATA: NORMAL
NST READ BY: NORMAL
NST RETURN: NORMAL
NST UTERINE ACTIVITY: NORMAL

## 2022-06-01 PROCEDURE — 90040 PR PRENATAL FOLLOW UP: CPT | Performed by: ADVANCED PRACTICE MIDWIFE

## 2022-06-01 ASSESSMENT — FIBROSIS 4 INDEX: FIB4 SCORE: 0.63

## 2022-06-01 NOTE — PROGRESS NOTES
Patient reports some low back pain and foot pain. She walks to work and back. Planning to work the remainder of the pregnancy. Discussed maternity support belt but very late in pregnancy. NST reactive today. Discussed epsom salt and warm baths that may help. She voices understanding. Precautions reviewed. GBS at next visit.

## 2022-06-09 ENCOUNTER — HOSPITAL ENCOUNTER (OUTPATIENT)
Facility: MEDICAL CENTER | Age: 37
End: 2022-06-09
Attending: NURSE PRACTITIONER
Payer: MEDICAID

## 2022-06-09 ENCOUNTER — APPOINTMENT (OUTPATIENT)
Dept: OBGYN | Facility: CLINIC | Age: 37
End: 2022-06-09
Payer: MEDICAID

## 2022-06-09 ENCOUNTER — ROUTINE PRENATAL (OUTPATIENT)
Dept: OBGYN | Facility: CLINIC | Age: 37
End: 2022-06-09
Payer: MEDICAID

## 2022-06-09 VITALS — BODY MASS INDEX: 27.99 KG/M2 | DIASTOLIC BLOOD PRESSURE: 59 MMHG | SYSTOLIC BLOOD PRESSURE: 103 MMHG | WEIGHT: 158 LBS

## 2022-06-09 DIAGNOSIS — O09.523 MULTIGRAVIDA OF ADVANCED MATERNAL AGE IN THIRD TRIMESTER: ICD-10-CM

## 2022-06-09 DIAGNOSIS — O09.523 MULTIGRAVIDA OF ADVANCED MATERNAL AGE IN THIRD TRIMESTER: Primary | ICD-10-CM

## 2022-06-09 PROCEDURE — 87150 DNA/RNA AMPLIFIED PROBE: CPT

## 2022-06-09 PROCEDURE — 87081 CULTURE SCREEN ONLY: CPT

## 2022-06-09 PROCEDURE — 90040 PR PRENATAL FOLLOW UP: CPT | Performed by: NURSE PRACTITIONER

## 2022-06-09 ASSESSMENT — FIBROSIS 4 INDEX: FIB4 SCORE: 0.63

## 2022-06-09 NOTE — PROGRESS NOTES
Pt. Here for OB/FU. Reports Good FM.   Good # [phone]  Pt. Denies VB, LOF, or UC's.   Pharmacy verified.   Chaperone offered and not indicated  Patient states no concerns   GBS today

## 2022-06-10 LAB — GP B STREP DNA SPEC QL NAA+PROBE: NEGATIVE

## 2022-06-12 ENCOUNTER — HOSPITAL ENCOUNTER (EMERGENCY)
Facility: MEDICAL CENTER | Age: 37
End: 2022-06-12
Attending: OBSTETRICS & GYNECOLOGY | Admitting: OBSTETRICS & GYNECOLOGY
Payer: MEDICAID

## 2022-06-12 VITALS
SYSTOLIC BLOOD PRESSURE: 119 MMHG | TEMPERATURE: 97.5 F | HEART RATE: 78 BPM | DIASTOLIC BLOOD PRESSURE: 74 MMHG | WEIGHT: 158 LBS | RESPIRATION RATE: 18 BRPM | HEIGHT: 63 IN | BODY MASS INDEX: 28 KG/M2

## 2022-06-12 LAB
APPEARANCE UR: CLEAR
COLOR UR AUTO: YELLOW
GLUCOSE UR QL STRIP.AUTO: NEGATIVE MG/DL
KETONES UR QL STRIP.AUTO: ABNORMAL MG/DL
LEUKOCYTE ESTERASE UR QL STRIP.AUTO: ABNORMAL
NITRITE UR QL STRIP.AUTO: NEGATIVE
PH UR STRIP.AUTO: 5.5 [PH] (ref 5–8)
PROT UR QL STRIP: NEGATIVE MG/DL
RBC UR QL AUTO: ABNORMAL
SP GR UR STRIP.AUTO: 1.02 (ref 1–1.03)

## 2022-06-12 PROCEDURE — 99281 EMR DPT VST MAYX REQ PHY/QHP: CPT | Mod: 25 | Performed by: NURSE PRACTITIONER

## 2022-06-12 PROCEDURE — 81002 URINALYSIS NONAUTO W/O SCOPE: CPT

## 2022-06-12 PROCEDURE — 59025 FETAL NON-STRESS TEST: CPT

## 2022-06-12 PROCEDURE — 59025 FETAL NON-STRESS TEST: CPT | Mod: 26 | Performed by: NURSE PRACTITIONER

## 2022-06-12 PROCEDURE — 99282 EMERGENCY DEPT VISIT SF MDM: CPT

## 2022-06-12 ASSESSMENT — FIBROSIS 4 INDEX: FIB4 SCORE: 0.63

## 2022-06-13 NOTE — DISCHARGE INSTRUCTIONS
Labor Instructions (37 - 39 weeks):  Call your physician or return to hospital if:  You have regular contractions that get progressively closer, longer and stronger.  Your water breaks (remember time and color).  You have bleeding like a period.  Your baby does not move enough to complete the daily kick counts (10 movements in 2 hours)  Your baby moves much less often than on the days before or you have not felt your baby move all day.

## 2022-06-13 NOTE — PROGRESS NOTES
1910: pt to labor and delivery, pt c/o having abd pain x3 at work.  Pt's boss sent her to labor and delivery to get assessed.  Pt denies vaginal bleeding or leaking.  Pt reports fetal movement.  efm and toco applied. Vss.  ua done.    2020: sve by rn 2/50/-3. Call to otoniel huang cnm.  Report given.  Orders to discharge home. Pt discharged in stable condition. Pt given labor precautions.

## 2022-06-13 NOTE — ED PROVIDER NOTES
"Emergency Obstetric Consultation     Date of Service  2022      PATIENT ID:  NAME:  Memo Mckeon  MRN:               [id number]  YOB: 1985     36 y.o. female  at 36w3d.    Subjective: Pt reports she had sharp abd pain in the middle of her belly x 3 times while at work, so her boss sent her in to be checked out.   Pregnancy complicated by limited, late prenatal care.    negative  For CTXS.   positive Feels pain   negative for LOF  negative for vaginal bleeding.   positive for fetal movement    ROS: Patient denies any fever chills, nausea, vomiting, headache, chest pain, shortness of breath, or dysuria or unusual swelling of hands or feet.     Objective:    Vitals:    22 191   BP: 119/74   Pulse: 78   Resp: 18   Temp: 36.4 °C (97.5 °F)   TempSrc: Temporal   Weight: 71.7 kg (158 lb)   Height: 1.6 m (5' 3\")     Temp (24hrs), Av.4 °C (97.5 °F), Min:36.4 °C (97.5 °F), Max:36.4 °C (97.5 °F)    General: No acute distress, resting comfortably in bed.  HEENT: normocephalic, nontraumatic, PERRLA, EOMI  Cardiovascular: Heart RRR with no murmurs, rubs or gallops. Distal Pulses 2+  Respiratory: symmetric chest expansion, lungs CTAB, with no wheezes, rales, rhonci  Abdomen: gravid, nontender  Musculoskeletal: strength 5/5 in four extremities  Neuro: non focal with no numbness, tingling or changes in sensation    Cervix:  2cm/80 per RN  Garvin: Uterine Contractions: none   FHRM: Baseline 125, Accels to 150, no decels, moderate variability  POC UA: trace ketones, trace protein    Assessment: 36 y.o. female  at 36w3d.    NST reactive per my read    Plan:   1. Patient is cleared to return home.   2. F/U in in Office for [facility] or [facility] for sxs labor  3. Instructions for labor given    BRAULIO Restrepo  "

## 2022-06-16 ENCOUNTER — ROUTINE PRENATAL (OUTPATIENT)
Dept: OBGYN | Facility: CLINIC | Age: 37
End: 2022-06-16

## 2022-06-16 ENCOUNTER — NON-PROVIDER VISIT (OUTPATIENT)
Dept: OBGYN | Facility: CLINIC | Age: 37
End: 2022-06-16
Payer: MEDICAID

## 2022-06-16 ENCOUNTER — HOSPITAL ENCOUNTER (OUTPATIENT)
Dept: LAB | Facility: MEDICAL CENTER | Age: 37
End: 2022-06-16
Attending: OBSTETRICS & GYNECOLOGY
Payer: MEDICAID

## 2022-06-16 VITALS
SYSTOLIC BLOOD PRESSURE: 113 MMHG | BODY MASS INDEX: 28 KG/M2 | DIASTOLIC BLOOD PRESSURE: 67 MMHG | WEIGHT: 158 LBS | HEIGHT: 63 IN

## 2022-06-16 DIAGNOSIS — Z34.92 SECOND TRIMESTER PREGNANCY: ICD-10-CM

## 2022-06-16 DIAGNOSIS — O09.93 SUPERVISION OF HIGH RISK PREGNANCY IN THIRD TRIMESTER: ICD-10-CM

## 2022-06-16 DIAGNOSIS — O09.523 MULTIGRAVIDA OF ADVANCED MATERNAL AGE IN THIRD TRIMESTER: ICD-10-CM

## 2022-06-16 PROBLEM — O09.90 SUPERVISION OF HIGH-RISK PREGNANCY: Status: ACTIVE | Noted: 2022-06-16

## 2022-06-16 LAB
GLUCOSE 1H P 50 G GLC PO SERPL-MCNC: 97 MG/DL (ref 70–139)
NST ACOUSTIC STIMULATION: NORMAL
NST ACTION NECESSARY: NORMAL
NST ASSESSMENT: NORMAL
NST BASELINE: NORMAL
NST INDICATIONS: NORMAL
NST OTHER DATA: NORMAL
NST READ BY: NORMAL
NST RETURN: NORMAL
NST UTERINE ACTIVITY: NORMAL

## 2022-06-16 PROCEDURE — 90040 PR PRENATAL FOLLOW UP: CPT

## 2022-06-16 PROCEDURE — 36415 COLL VENOUS BLD VENIPUNCTURE: CPT

## 2022-06-16 PROCEDURE — 80307 DRUG TEST PRSMV CHEM ANLYZR: CPT

## 2022-06-16 PROCEDURE — 82950 GLUCOSE TEST: CPT

## 2022-06-16 ASSESSMENT — FIBROSIS 4 INDEX: FIB4 SCORE: 0.63

## 2022-06-16 NOTE — PROGRESS NOTES
Pt here today for OB follow up  Negative GBS, pt aware  Reports +FM  WT: 71.668 kg  BP:113/67  Preferred pharmacy verified with pt.  Pt states was seen at [facility] ER on 06/12/22 due uterine pain. States she was told she was dehydrated.   Pt states no complaints or concerns today  Pt has not done the 1 hr gtt due to work. States she is no sure if she will be able to get done.   Good # [phone]

## 2022-06-16 NOTE — PROGRESS NOTES
"S:  Memo here with older daughter for routine OB follow up.  Reports good FM.  Denies VB, LOF, RUCs, or vaginal DC. Some left sided round ligament pain.     After discussion of R/B/A of IOL at 40wks for AMA, still considering. Desires to talk to  before scheduling IOL. May want to wait until 41wks with twice weekly NSTs after 40wks.     O:    Vitals:    06/16/22 0952   BP: 113/67   Weight: 71.7 kg (158 lb)   Height: 1.6 m (5' 3\")   See flow sheet.    A:    IUP at 37w0d  S=D  Reactive, cat 1 NST   Patient Active Problem List    Diagnosis Date Noted   • Supervision of high-risk pregnancy 06/16/2022   • Multigravida of advanced maternal age in third trimester 06/16/2022       P:  1.  Continue FKCs.         2.  Labor precautions given.  Instructions given on where to go.  Pt receptive to education.         3.  D/w pt R/B/A of IOL, recommendation at 40wks for AMA vs waiting to 41wks. Still deciding. Will order at next visit.         4.  Questions answered.         5.  Encouraged adequate water intake       6.  F/u 1wk       7.  GBS negative - reviewed w pt.       8.  Round ligament pain helps discussed including tylenol, maternity belt, supporting the area, warm compresses, and rest    Orders Placed This Encounter   • POCT Fetal Nonstress Test           Milana Gallagher C.N.M.    "

## 2022-06-18 LAB
AMPHET CTO UR CFM-MCNC: NEGATIVE NG/ML
BARBITURATES CTO UR CFM-MCNC: NEGATIVE NG/ML
BENZODIAZ CTO UR CFM-MCNC: NEGATIVE NG/ML
CANNABINOIDS CTO UR CFM-MCNC: NEGATIVE NG/ML
COCAINE CTO UR CFM-MCNC: NEGATIVE NG/ML
DRUG COMMENT 753798: NORMAL
METHADONE CTO UR CFM-MCNC: NEGATIVE NG/ML
OPIATES CTO UR CFM-MCNC: NEGATIVE NG/ML
PCP CTO UR CFM-MCNC: NEGATIVE NG/ML
PROPOXYPH CTO UR CFM-MCNC: NEGATIVE NG/ML

## 2022-06-24 ENCOUNTER — NON-PROVIDER VISIT (OUTPATIENT)
Dept: OBGYN | Facility: CLINIC | Age: 37
End: 2022-06-24
Payer: MEDICAID

## 2022-06-24 ENCOUNTER — ROUTINE PRENATAL (OUTPATIENT)
Dept: OBGYN | Facility: CLINIC | Age: 37
End: 2022-06-24
Payer: MEDICAID

## 2022-06-24 VITALS — SYSTOLIC BLOOD PRESSURE: 107 MMHG | BODY MASS INDEX: 28.56 KG/M2 | DIASTOLIC BLOOD PRESSURE: 71 MMHG | WEIGHT: 161.2 LBS

## 2022-06-24 DIAGNOSIS — O09.523 MULTIGRAVIDA OF ADVANCED MATERNAL AGE IN THIRD TRIMESTER: ICD-10-CM

## 2022-06-24 PROCEDURE — 90040 PR PRENATAL FOLLOW UP: CPT | Performed by: STUDENT IN AN ORGANIZED HEALTH CARE EDUCATION/TRAINING PROGRAM

## 2022-06-24 ASSESSMENT — FIBROSIS 4 INDEX: FIB4 SCORE: 0.63

## 2022-06-24 NOTE — PROGRESS NOTES
Pt here today for OB follow up  Negative GBS, pt aware  Reports +FM  WT: 161.2 lb  BP: 107/71  Preferred pharmacy verified with pt.  Pt states no complaints or concerns today  Good # [phone]    Provider please review FYI with pt.

## 2022-06-24 NOTE — PROGRESS NOTES
Return OB Visit    35 y/o  at 38w1d by LMP c/w 26 week U/S who has been followed by [facility] midwives and presents today for routine OB f/u.      SUBJECTIVE:   Pt presents for routine OB follow up.   Reports good fetal movement.  Reports some lower abdominal pain but denies contractions, vaginal bleeding, or leakage of fluid.    Concerns: None, desires cervical exam today  Questions answered.    Pregnancy complications:  Late establishment of care  AMA  Fetal dolichocephaly on U/S in April    O: /71   Wt 73.1 kg (161 lb 3.2 oz)   LMP 2021   BMI 28.56 kg/m²   Fundal Height: 36 cm  FHR: present, 140s    SVE: 2 cm/70%/-2, posterior, soft  Vertex position by Leopold's and confirmed by bedside U/S    NST:  Baseline 130-135  Moderate variabilitly  +accels  No decels  [place]: no ctx, some artifact likely due to patient movement    Lab:  A+  GBS negative  1-hour GTT 97  Hep B sAg NR  Hep C Ab NR  HIV NR  RPR NR  RI  GC/CT neg/neg        A/P:  36 y.o.  at 38w1d presents for routine obstetric follow-up and NST.  NST Reactive by my interpretation  Pregnancy complicated by AMA, fetal dolichocephaly on anatomy U/S  Size equals dates (<3 cm difference)  FHR and maternal BP reassuring  Patients' weight gain, fluid intake and exercise level discussed.    1.  Continue prenatal vitamins.  2.  Regular physical activity  3.  Drink at least 2L of water daily  4.  Labor precautions Discussed  5.  Follow-up in 1 week.  6.  GBS negative  7.  Discussed options for scheduling IOL; patient has discussed with provider in past and patient prefers to wait until 41 weeks and to have NSTs increased to twice weekly after 40 weeks gestation. IOL ordered today.       Aishwarya Mishra MD

## 2022-07-01 ENCOUNTER — NON-PROVIDER VISIT (OUTPATIENT)
Dept: OBGYN | Facility: CLINIC | Age: 37
End: 2022-07-01
Payer: MEDICAID

## 2022-07-01 ENCOUNTER — ROUTINE PRENATAL (OUTPATIENT)
Dept: OBGYN | Facility: CLINIC | Age: 37
End: 2022-07-01

## 2022-07-01 VITALS — BODY MASS INDEX: 27.9 KG/M2 | WEIGHT: 157.5 LBS | SYSTOLIC BLOOD PRESSURE: 114 MMHG | DIASTOLIC BLOOD PRESSURE: 73 MMHG

## 2022-07-01 DIAGNOSIS — O09.93 SUPERVISION OF HIGH RISK PREGNANCY IN THIRD TRIMESTER: ICD-10-CM

## 2022-07-01 PROCEDURE — 90040 PR PRENATAL FOLLOW UP: CPT | Performed by: STUDENT IN AN ORGANIZED HEALTH CARE EDUCATION/TRAINING PROGRAM

## 2022-07-01 ASSESSMENT — FIBROSIS 4 INDEX: FIB4 SCORE: 0.63

## 2022-07-01 NOTE — PROGRESS NOTES
Pt. Here for OB/FU. Reports Good FM.   Good # [phone]  Pt. Denies VB, LOF, or UC's.   IOL on 7/13/2022 @ 0900 am, pt notified.   Chaperone offered and not needed.   GBS negative

## 2022-07-01 NOTE — PROGRESS NOTES
Return OB Visit    This is a 37 y/o  at 39w1d by 26 week U/S.    SUBJECTIVE:   Pt presents for routine OB follow up.   Reports good fetal movement.  Reports irregular contractions. Denies vaginal bleeding or leakage of fluid.    Concerns: Weight lower today than last visit; has been tolerating PO intake with normal UO.   Questions answered.    Pregnancy complications:  AMA    O: /73   Wt 71.4 kg (157 lb 8 oz)   LMP 2021   BMI 27.90 kg/m²   Fundal Height: 37 cm  FHR: 120s  Cervical exam: 3 cm/70%/-2    NST:  Indication: AMA  Baseline: 120  Variability: Moderate  Accels: Present  Decels: Absent  Contractions: irregular ctx, 2 over 20 minute period    A/P:  36 y.o.  at 39w1d presents for routine obstetric follow-up.   Pregnancy complicated by AMA  Size equals dates  FHR and maternal BP reassuring  Patients' weight gain, fluid intake and exercise level discussed.    1.  Continue prenatal vitamins.  2.  Regular physical activity  3.  Drink at least 2L of water daily  4.  Labor precautions Discussed  5.  Follow-up in 1 week or PRN; IOL scheduled on . After next week, will need twice weekly NSTs until IOL  6.  GBS negative    Aishwarya Mishra MD

## 2022-07-03 ENCOUNTER — HOSPITAL ENCOUNTER (INPATIENT)
Facility: MEDICAL CENTER | Age: 37
LOS: 1 days | End: 2022-07-04
Attending: OBSTETRICS & GYNECOLOGY | Admitting: OBSTETRICS & GYNECOLOGY
Payer: MEDICAID

## 2022-07-03 PROBLEM — Z34.90 TERM PREGNANCY: Status: ACTIVE | Noted: 2022-07-03

## 2022-07-03 LAB
BASOPHILS # BLD AUTO: 0.2 % (ref 0–1.8)
BASOPHILS # BLD: 0.03 K/UL (ref 0–0.12)
EOSINOPHIL # BLD AUTO: 0.14 K/UL (ref 0–0.51)
EOSINOPHIL NFR BLD: 1.1 % (ref 0–6.9)
ERYTHROCYTE [DISTWIDTH] IN BLOOD BY AUTOMATED COUNT: 40.9 FL (ref 35.9–50)
ERYTHROCYTE [DISTWIDTH] IN BLOOD BY AUTOMATED COUNT: 41.1 FL (ref 35.9–50)
HCT VFR BLD AUTO: 33.5 % (ref 37–47)
HCT VFR BLD AUTO: 41.6 % (ref 37–47)
HGB BLD-MCNC: 11.3 G/DL (ref 12–16)
HGB BLD-MCNC: 14 G/DL (ref 12–16)
HOLDING TUBE BB 8507: NORMAL
IMM GRANULOCYTES # BLD AUTO: 0.07 K/UL (ref 0–0.11)
IMM GRANULOCYTES NFR BLD AUTO: 0.6 % (ref 0–0.9)
LYMPHOCYTES # BLD AUTO: 3.35 K/UL (ref 1–4.8)
LYMPHOCYTES NFR BLD: 27.3 % (ref 22–41)
MCH RBC QN AUTO: 29.4 PG (ref 27–33)
MCH RBC QN AUTO: 29.7 PG (ref 27–33)
MCHC RBC AUTO-ENTMCNC: 33.7 G/DL (ref 33.6–35)
MCHC RBC AUTO-ENTMCNC: 33.7 G/DL (ref 33.6–35)
MCV RBC AUTO: 87.2 FL (ref 81.4–97.8)
MCV RBC AUTO: 87.9 FL (ref 81.4–97.8)
MONOCYTES # BLD AUTO: 0.67 K/UL (ref 0–0.85)
MONOCYTES NFR BLD AUTO: 5.5 % (ref 0–13.4)
NEUTROPHILS # BLD AUTO: 8.03 K/UL (ref 2–7.15)
NEUTROPHILS NFR BLD: 65.3 % (ref 44–72)
NRBC # BLD AUTO: 0 K/UL
NRBC BLD-RTO: 0 /100 WBC
PLATELET # BLD AUTO: 226 K/UL (ref 164–446)
PLATELET # BLD AUTO: 318 K/UL (ref 164–446)
PMV BLD AUTO: 9.1 FL (ref 9–12.9)
PMV BLD AUTO: 9.3 FL (ref 9–12.9)
RBC # BLD AUTO: 3.81 M/UL (ref 4.2–5.4)
RBC # BLD AUTO: 4.77 M/UL (ref 4.2–5.4)
WBC # BLD AUTO: 12.3 K/UL (ref 4.8–10.8)
WBC # BLD AUTO: 13.6 K/UL (ref 4.8–10.8)

## 2022-07-03 PROCEDURE — 700111 HCHG RX REV CODE 636 W/ 250 OVERRIDE (IP): Performed by: NURSE PRACTITIONER

## 2022-07-03 PROCEDURE — 36415 COLL VENOUS BLD VENIPUNCTURE: CPT

## 2022-07-03 PROCEDURE — 10907ZC DRAINAGE OF AMNIOTIC FLUID, THERAPEUTIC FROM PRODUCTS OF CONCEPTION, VIA NATURAL OR ARTIFICIAL OPENING: ICD-10-PCS | Performed by: NURSE PRACTITIONER

## 2022-07-03 PROCEDURE — 700111 HCHG RX REV CODE 636 W/ 250 OVERRIDE (IP)

## 2022-07-03 PROCEDURE — A9270 NON-COVERED ITEM OR SERVICE: HCPCS | Performed by: NURSE PRACTITIONER

## 2022-07-03 PROCEDURE — 700102 HCHG RX REV CODE 250 W/ 637 OVERRIDE(OP): Performed by: NURSE PRACTITIONER

## 2022-07-03 PROCEDURE — 85027 COMPLETE CBC AUTOMATED: CPT

## 2022-07-03 PROCEDURE — 700105 HCHG RX REV CODE 258: Performed by: NURSE PRACTITIONER

## 2022-07-03 PROCEDURE — 59410 OBSTETRICAL CARE: CPT | Performed by: NURSE PRACTITIONER

## 2022-07-03 PROCEDURE — 770002 HCHG ROOM/CARE - OB PRIVATE (112)

## 2022-07-03 PROCEDURE — 85025 COMPLETE CBC W/AUTO DIFF WBC: CPT

## 2022-07-03 PROCEDURE — 59409 OBSTETRICAL CARE: CPT

## 2022-07-03 PROCEDURE — 304965 HCHG RECOVERY SERVICES

## 2022-07-03 RX ORDER — SODIUM CHLORIDE, SODIUM LACTATE, POTASSIUM CHLORIDE, CALCIUM CHLORIDE 600; 310; 30; 20 MG/100ML; MG/100ML; MG/100ML; MG/100ML
INJECTION, SOLUTION INTRAVENOUS PRN
Status: DISCONTINUED | OUTPATIENT
Start: 2022-07-03 | End: 2022-07-04 | Stop reason: HOSPADM

## 2022-07-03 RX ORDER — DOCUSATE SODIUM 100 MG/1
100 CAPSULE, LIQUID FILLED ORAL 2 TIMES DAILY PRN
Status: DISCONTINUED | OUTPATIENT
Start: 2022-07-03 | End: 2022-07-04 | Stop reason: HOSPADM

## 2022-07-03 RX ORDER — MISOPROSTOL 200 UG/1
800 TABLET ORAL
Status: DISCONTINUED | OUTPATIENT
Start: 2022-07-03 | End: 2022-07-03 | Stop reason: HOSPADM

## 2022-07-03 RX ORDER — ACETAMINOPHEN 500 MG
1000 TABLET ORAL
Status: DISCONTINUED | OUTPATIENT
Start: 2022-07-03 | End: 2022-07-03 | Stop reason: HOSPADM

## 2022-07-03 RX ORDER — VITAMIN A ACETATE, BETA CAROTENE, ASCORBIC ACID, CHOLECALCIFEROL, .ALPHA.-TOCOPHEROL ACETATE, DL-, THIAMINE MONONITRATE, RIBOFLAVIN, NIACINAMIDE, PYRIDOXINE HYDROCHLORIDE, FOLIC ACID, CYANOCOBALAMIN, CALCIUM CARBONATE, FERROUS FUMARATE, ZINC OXIDE, CUPRIC OXIDE 3080; 12; 120; 400; 1; 1.84; 3; 20; 22; 920; 25; 200; 27; 10; 2 [IU]/1; UG/1; MG/1; [IU]/1; MG/1; MG/1; MG/1; MG/1; MG/1; [IU]/1; MG/1; MG/1; MG/1; MG/1; MG/1
1 TABLET, FILM COATED ORAL
Status: DISCONTINUED | OUTPATIENT
Start: 2022-07-03 | End: 2022-07-04 | Stop reason: HOSPADM

## 2022-07-03 RX ORDER — IBUPROFEN 800 MG/1
800 TABLET ORAL EVERY 8 HOURS PRN
Status: DISCONTINUED | OUTPATIENT
Start: 2022-07-03 | End: 2022-07-04 | Stop reason: HOSPADM

## 2022-07-03 RX ORDER — MISOPROSTOL 200 UG/1
600 TABLET ORAL
Status: DISCONTINUED | OUTPATIENT
Start: 2022-07-03 | End: 2022-07-04 | Stop reason: HOSPADM

## 2022-07-03 RX ORDER — IBUPROFEN 800 MG/1
800 TABLET ORAL
Status: DISCONTINUED | OUTPATIENT
Start: 2022-07-03 | End: 2022-07-03 | Stop reason: HOSPADM

## 2022-07-03 RX ORDER — ALUMINA, MAGNESIA, AND SIMETHICONE 2400; 2400; 240 MG/30ML; MG/30ML; MG/30ML
30 SUSPENSION ORAL EVERY 6 HOURS PRN
Status: DISCONTINUED | OUTPATIENT
Start: 2022-07-03 | End: 2022-07-03 | Stop reason: HOSPADM

## 2022-07-03 RX ORDER — METHYLERGONOVINE MALEATE 0.2 MG/ML
0.2 INJECTION INTRAVENOUS
Status: DISCONTINUED | OUTPATIENT
Start: 2022-07-03 | End: 2022-07-04 | Stop reason: HOSPADM

## 2022-07-03 RX ORDER — ACETAMINOPHEN 500 MG
1000 TABLET ORAL EVERY 6 HOURS PRN
Status: DISCONTINUED | OUTPATIENT
Start: 2022-07-03 | End: 2022-07-04 | Stop reason: HOSPADM

## 2022-07-03 RX ORDER — METHYLERGONOVINE MALEATE 0.2 MG/ML
0.2 INJECTION INTRAVENOUS
Status: DISCONTINUED | OUTPATIENT
Start: 2022-07-03 | End: 2022-07-03 | Stop reason: HOSPADM

## 2022-07-03 RX ORDER — SODIUM CHLORIDE, SODIUM LACTATE, POTASSIUM CHLORIDE, CALCIUM CHLORIDE 600; 310; 30; 20 MG/100ML; MG/100ML; MG/100ML; MG/100ML
1000 INJECTION, SOLUTION INTRAVENOUS CONTINUOUS
Status: DISCONTINUED | OUTPATIENT
Start: 2022-07-03 | End: 2022-07-03 | Stop reason: HOSPADM

## 2022-07-03 RX ORDER — TERBUTALINE SULFATE 1 MG/ML
0.25 INJECTION, SOLUTION SUBCUTANEOUS
Status: DISCONTINUED | OUTPATIENT
Start: 2022-07-03 | End: 2022-07-03 | Stop reason: HOSPADM

## 2022-07-03 RX ORDER — OXYTOCIN 10 [USP'U]/ML
10 INJECTION, SOLUTION INTRAMUSCULAR; INTRAVENOUS
Status: DISCONTINUED | OUTPATIENT
Start: 2022-07-03 | End: 2022-07-03 | Stop reason: HOSPADM

## 2022-07-03 RX ORDER — ONDANSETRON 2 MG/ML
4 INJECTION INTRAMUSCULAR; INTRAVENOUS EVERY 6 HOURS PRN
Status: DISCONTINUED | OUTPATIENT
Start: 2022-07-03 | End: 2022-07-03 | Stop reason: HOSPADM

## 2022-07-03 RX ORDER — HYDROXYZINE HYDROCHLORIDE 25 MG/1
25 TABLET, FILM COATED ORAL 3 TIMES DAILY PRN
Status: DISCONTINUED | OUTPATIENT
Start: 2022-07-03 | End: 2022-07-03 | Stop reason: HOSPADM

## 2022-07-03 RX ORDER — ONDANSETRON 4 MG/1
4 TABLET, ORALLY DISINTEGRATING ORAL EVERY 6 HOURS PRN
Status: DISCONTINUED | OUTPATIENT
Start: 2022-07-03 | End: 2022-07-03 | Stop reason: HOSPADM

## 2022-07-03 RX ORDER — DEXTROSE, SODIUM CHLORIDE, SODIUM LACTATE, POTASSIUM CHLORIDE, AND CALCIUM CHLORIDE 5; .6; .31; .03; .02 G/100ML; G/100ML; G/100ML; G/100ML; G/100ML
INJECTION, SOLUTION INTRAVENOUS CONTINUOUS
Status: DISCONTINUED | OUTPATIENT
Start: 2022-07-03 | End: 2022-07-03 | Stop reason: HOSPADM

## 2022-07-03 RX ADMIN — ACETAMINOPHEN 1000 MG: 500 TABLET ORAL at 07:52

## 2022-07-03 RX ADMIN — PRENATAL WITH FERROUS FUM AND FOLIC ACID 1 TABLET: 3080; 920; 120; 400; 22; 1.84; 3; 20; 10; 1; 12; 200; 27; 25; 2 TABLET ORAL at 07:52

## 2022-07-03 RX ADMIN — HYDROXYZINE HYDROCHLORIDE 25 MG: 25 TABLET, FILM COATED ORAL at 02:08

## 2022-07-03 RX ADMIN — SODIUM CHLORIDE, POTASSIUM CHLORIDE, SODIUM LACTATE AND CALCIUM CHLORIDE 1000 ML: 600; 310; 30; 20 INJECTION, SOLUTION INTRAVENOUS at 01:12

## 2022-07-03 RX ADMIN — OXYTOCIN 125 ML/HR: 10 INJECTION, SOLUTION INTRAMUSCULAR; INTRAVENOUS at 04:10

## 2022-07-03 RX ADMIN — IBUPROFEN 800 MG: 800 TABLET, FILM COATED ORAL at 18:52

## 2022-07-03 RX ADMIN — FENTANYL CITRATE 100 MCG: 50 INJECTION, SOLUTION INTRAMUSCULAR; INTRAVENOUS at 01:10

## 2022-07-03 RX ADMIN — OXYTOCIN 2000 ML/HR: 10 INJECTION, SOLUTION INTRAMUSCULAR; INTRAVENOUS at 01:35

## 2022-07-03 RX ADMIN — IBUPROFEN 800 MG: 800 TABLET, FILM COATED ORAL at 07:52

## 2022-07-03 ASSESSMENT — LIFESTYLE VARIABLES
ALCOHOL_USE: NO
HOW MANY TIMES IN THE PAST YEAR HAVE YOU HAD 5 OR MORE DRINKS IN A DAY: 0
ON A TYPICAL DAY WHEN YOU DRINK ALCOHOL HOW MANY DRINKS DO YOU HAVE: 0
TOTAL SCORE: 0
TOTAL SCORE: 0
HAVE PEOPLE ANNOYED YOU BY CRITICIZING YOUR DRINKING: NO
DOES PATIENT WANT TO STOP DRINKING: NO
EVER HAD A DRINK FIRST THING IN THE MORNING TO STEADY YOUR NERVES TO GET RID OF A HANGOVER: NO
CONSUMPTION TOTAL: NEGATIVE
HAVE YOU EVER FELT YOU SHOULD CUT DOWN ON YOUR DRINKING: NO
EVER_SMOKED: NEVER
EVER FELT BAD OR GUILTY ABOUT YOUR DRINKING: NO
TOTAL SCORE: 0
AVERAGE NUMBER OF DAYS PER WEEK YOU HAVE A DRINK CONTAINING ALCOHOL: 0

## 2022-07-03 ASSESSMENT — PAIN DESCRIPTION - PAIN TYPE
TYPE: ACUTE PAIN
TYPE: ACUTE PAIN

## 2022-07-03 ASSESSMENT — PATIENT HEALTH QUESTIONNAIRE - PHQ9
1. LITTLE INTEREST OR PLEASURE IN DOING THINGS: NOT AT ALL
SUM OF ALL RESPONSES TO PHQ9 QUESTIONS 1 AND 2: 0
2. FEELING DOWN, DEPRESSED, IRRITABLE, OR HOPELESS: NOT AT ALL

## 2022-07-03 NOTE — PROGRESS NOTES
0430 Admitted from L and D per wheelchair, Pt oriented to room, educated her to call the fist time go to the bathroom, Assessment done with scant bleeding, Pt voided in L and D without difficulty, Pt denies pain at this time. Admission care rendered.

## 2022-07-03 NOTE — PROGRESS NOTES
Pt up to the BR, voided, lizzy care done. Assisted to w/c, transferred to PP. Report to INDIA Pearce

## 2022-07-03 NOTE — H&P
History and Physical    Memo Mckeon is a 36 y.o. female  -Para:     Gestational Age:  39w3d  Admitted for:   Active Labor  Admitted to  [facility].  [facility]    HPI: Patient is admitted with the above mentioned Chief Complaint and States   Loss of fluid:   negative  Abdominal Pain:  negative  Uterine Contractions:  Positive since 0000  Vaginal Bleeding:  negative  Fetal Movement:  normal  Patient denies fever, chills, nausea, vomiting , headache, visual disturbance, or dysuria  Patient's last menstrual period was 2021.  Estimated Date of Delivery: 22  Final TRISTA: 2022, by Last Menstrual Period    Patient Active Problem List    Diagnosis Date Noted   • Term pregnancy 2022   • Supervision of high-risk pregnancy 2022   • Multigravida of advanced maternal age in third trimester 2022       Admitting DX: Term pregnancy [Z34.90]   Pregnancy Complications:  none  OB Risk Factors:   advanced maternal age  Labor State:    Active phase labor.    History:   has a past medical history of GERD (gastroesophageal reflux disease).     has no past surgical history on file.    OB History    Para Term  AB Living   4 3 3     3   SAB IAB Ectopic Molar Multiple Live Births             3      # Outcome Date GA Lbr Matthieu/2nd Weight Sex Delivery Anes PTL Lv   4 Current            3 Term     F Vag-Spont   ABBI   2 Term 2018    F Vag-Spont   ABBI   1 Term 2011    F Vag-Spont   ABBI       Medications:  No current facility-administered medications on file prior to encounter.     Current Outpatient Medications on File Prior to Encounter   Medication Sig Dispense Refill   • Prenatal MV-Min-Fe Fum-FA-DHA (PRENATAL 1 PO) Take  by mouth.         Allergies:  Patient has no known allergies.    ROS:   Neuro: negative    Cardiovascular: negative  Gastro intestinal: negative  Genitourinary: negative            Physical Exam:  LMP  09/30/2021   Constitutional: healthy-appearing, Well-developed, well-nourished, in no acute distress  No JVD: while supine  HEENT: EOMI  Breast Exam: Inspection negative. No nipple discharge or bleeding. No masses or nodularity palpable  Cardio: regular rate and rhythm  Lung: unlabored respirations, no intercostal retractions or accessory muscle use, clear to auscultation without rales or wheezes  Abdomen: abdomen is soft without significant tenderness, masses, organomegaly or guarding  Extremity: extremities, peripheral pulses and reflexes normal, no edema, redness or tenderness in the calves or thighs, Homans sign is negative, no sign of DVT, feet normal, good pulses, normal color, temperature and sensation    Cervical Exam: 90%  Cervix Dilatation: 6  Station: negative 1  Pelvis: Adequate  Fetal Assessment: Fetal heart variability: moderate  Fetal Heart Rate decelerations: none  Fetal Heart Rate accelerations: yes  Baseline FHR: 110 per minute  Uterine contractions: regular, every 2 minutes  Estimated Fetal Weight: 3000 - 3500g      Labs:      Prenatal Results     General (Most Recent Result)     Test Value Date Time    ABO  A  03/13/22 1627    Rh  POS  03/13/22 1627    Antibody screen  NEG  03/13/22 1627    HbA1c       Chlamydia by PCR  Negative  03/29/22 1151    Gonorrhea by PCR  Negative  03/29/22 1151    RPR/Syphilus  Non-Reactive  03/13/22 1627       Non-Reactive  03/13/22 1627    HSV 1/2 by PCR (non-serum)       HSV 1/2 (serum)       HSV 1       HSV 2       HPV (16)       HBsAg  Non-Reactive  03/13/22 1627    HIV-1 HIV-2 Antibodies  Non-Reactive  03/13/22 1627    Rubella  >500 IU/mL 03/13/22 1627    Tb             Pap Smear (Most Recent Result)     Test Value Date Time    Pap smear       Pap smear w/HPV       Pap smear w/CTNG       Pap smar w/HPV CTNG       Pap smear (reflex HPV ACUS)       Pap smear (reflex HPV ASCUS w/CTNG)       Pathology gyn specimen             Urinalysis (Most Recent Result)     Test  Value Date Time    Urinalysis       POC urinalysis       Urine drug screen (w/ conf)   (See Report)   06/16/22 1123    Urine culture ([id number])   (See Report)   10/22/21 0826    Urine Protein/Creatinine Ratio             Urinalysis, Culture if indicated     Test Value Date Time    Color  Yellow  10/22/21 0826    Appearance  Clear  10/22/21 0826    Specific Gravity  1.030  10/22/21 0826    PH  5.0  10/22/21 0826    Glucose  Negative mg/dL 10/22/21 0826    Ketones  40 mg/dL 10/22/21 0826    Protein  Negative mg/dL 10/22/21 0826    Bilirubin  Negative  10/22/21 0826    Nitrites  Negative  10/22/21 0826    Leukocytes Esterase  Trace  10/22/21 0826    Blood  Moderate  10/22/21 0826    Comment  Microscopic  10/22/21 0826    Culture             Urine Drug Screen     Test Value Date Time    Amphetamines       Barbiturates  Negative ng/mL 06/16/22 1123    Benzodiazepines  Negative ng/mL 06/16/22 1123    Cocaine  Negative ng/mL 06/16/22 1123    Methadone  Negative ng/mL 06/16/22 1123    Opiates       Oxycodone       Phencylidine  Negative ng/mL 06/16/22 1123    Propoxyphene       Marijuana Metabolite  Negative ng/mL 06/16/22 1123          1st Trimester     Test Value Date Time    Hgb  15.3 g/dL 10/22/21 0826    Hct  43.6 % 10/22/21 0826    Fasting Glucose Tolerance       GTT, 1 hour       GTT, 2 hours       GTT, 3 hours             2nd Trimester     Test Value Date Time    Hgb  12.5 g/dL 03/13/22 1627    Hct  38.1 % 03/13/22 1627    AST       ALT       Uric Acid       Fasting Glucose Tolerance       GTT, 1 hour       GTT, 2 hours       GTT, 3 hours             3rd Trimester     Test Value Date Time    Hgb       Hct       Platelet count       GBS (HAWTHORNE BROTH)  Negative  06/09/22 1047    Fasting Glucose Tolerance       GTT, 1 hour  97 mg/dL 06/16/22 1123    GTT, 2 hous       GTT, 3hours             Congenital Disease Screening     Test Value Date Time    First Trimester Screen       Quad Screen       BH Electrophoresis        Cystic Fibrosis Carrier Study       SMA       AFP Maternal Serum        AFP Tetra       NIPT             Legend    ^: Historical                          Assessment:  Gestational Age:  39w3d  Labor State:   Labor, Active  Risk Factors:   advanced maternal age  Pregnancy Complications: none    Patient Active Problem List    Diagnosis Date Noted   • Term pregnancy 2022   • Supervision of high-risk pregnancy 2022   • Multigravida of advanced maternal age in third trimester 2022       Plan:   Admitted for: Active Labor    CBC and UA  routine urinalysis  Antibiotics: Not indicated at this time  GBS negative  IV meds or epidural as desired    Beatriz Roca C.N.M.

## 2022-07-03 NOTE — PROGRESS NOTES
"Received  Report  from \"INDIA Peterson\"  on patient and assumed care.  Pt denies any pain or any questions or needs at this time.  "

## 2022-07-03 NOTE — L&D DELIVERY NOTE
Memo Mckeon is a 36 y.o. female    VAGINAL DELIVERY NOTE:    OB History    Para Term  AB Living   4 3 3     3   SAB IAB Ectopic Molar Multiple Live Births             3      # Outcome Date GA Lbr Matthieu/2nd Weight Sex Delivery Anes PTL Lv   4 Current            3 Term     F Vag-Spont   ABBI   2 Term 2018    F Vag-Spont   ABBI   1 Term     F Vag-Spont   ABBI       Weeks gestation: 39w3d  Diagnosis: Term IUP, active labor  GBS: negative     Memo presented early morning 7/3/2022 in active labor at 6 cm. She was noted to be GBS negative. Desired epidural and bolus was started, but she rapidly progressed to C/C/+1 before anesthesia could be placed. She had AROM with clear fluid right before pushing. She pushed for 2 contractions to have a  of a viable male infant. Cord gases were not sent.     of viable male at 0126 over a intact perineum. Nuchal cord present: yes , x1, tight, delivered through. Cord also draped over the right shoulder. Shoulders delivered easily.  Apgars 8 and 9 at 1 and 5 minutes respectively  Birth weight: 2925g    Placenta: spontaneous and intact at 0140 with 3 VC    Laceration: none    Anesthesia: None  Excellent hemostasis  EBL: 100 ml    Sponge and needle counts correct: yes    Tolerated procedure well  Patient and infant will be transferred to postpartum unit to recover    Beatirz Roca CNM, APRN  Dr Barriga is the attending MD today

## 2022-07-03 NOTE — CONSULTS
"Initial Lactation Visit:    Met with MOB briefly for an initial lactation visit.  MOB delivered her fourth baby this morning at 0126 at 39.3 weeks gestation.  MOB appeared to be attempting to take a nap when this LC walked into the room.  Lactation assistance was offered, but MOB declined.  She stated baby was breastfeeding \"okay,\" but did not desire latch assistance at this time.  MOB stated her feeding plan for baby is to offer him both breast milk and formula.    MOB was encouraged to put baby to the breast first at every feed prior to offering him formula to protect and grow milk supply.    MOB was encouraged to call for lactation support as needed.  "

## 2022-07-03 NOTE — CARE PLAN
Problem: Psychosocial - Postpartum  Goal: Patient will verbalize and demonstrate effective bonding and parenting behavior  Outcome: Progressing     Problem: Knowledge Deficit - Postpartum  Goal: Patient will verbalize and demonstrate understanding of self and infant care  Outcome: Progressing     Problem: Altered Physiologic Condition  Goal: Patient physiologically stable as evidenced by normal lochia, palpable uterine involution and vitals within normal limits  Outcome: Progressing   The patient is hemodynamically stable    Shift Goals: pain controlled, rest  Clinical Goals: maintain uterine good tone, prevent bladder distention  Patient Goals: pain controlled, rest  Family Goals: rest    Progress made toward(s) clinical / shift goals:  pt verbalized acceptable level of pain    Patient is not progressing towards the following goals:

## 2022-07-03 NOTE — CARE PLAN
Problem: Risk for Injury  Goal: Patient and fetus will be free of preventable injury/complications  Outcome: Met  Note:  of a viable male by KINDRA Roca CNM. Apgars 8/9     Problem: Pain  Goal: Patient's pain will be alleviated or reduced to the patient’s comfort goal  Note: Pt desired epidural for for pain management, anesthesia unavailable. Labor support given.Pt tolerated well.

## 2022-07-03 NOTE — PROGRESS NOTES
Late entry: Summary of events:     EDC 2022 EGA 39.3    Pt presented to L&D for c/o UC's, denies LOF, vaginal bleeding, states + fetal movement. EFM and TOCO applied. VE /BBOW. FHT's in 90's, pt position changed. KINDRA Roca CNM called to room. Orders for admission received. IV started, fluids bolusing. FHT's 110's.     0126-  of a viable male by KINDRA Roca CNM. Apgars 8/9

## 2022-07-04 VITALS
DIASTOLIC BLOOD PRESSURE: 72 MMHG | WEIGHT: 156.97 LBS | BODY MASS INDEX: 27.81 KG/M2 | TEMPERATURE: 98.4 F | RESPIRATION RATE: 20 BRPM | SYSTOLIC BLOOD PRESSURE: 118 MMHG | OXYGEN SATURATION: 97 % | HEART RATE: 72 BPM

## 2022-07-04 PROCEDURE — 700102 HCHG RX REV CODE 250 W/ 637 OVERRIDE(OP): Performed by: NURSE PRACTITIONER

## 2022-07-04 PROCEDURE — A9270 NON-COVERED ITEM OR SERVICE: HCPCS | Performed by: NURSE PRACTITIONER

## 2022-07-04 RX ORDER — IBUPROFEN 800 MG/1
800 TABLET ORAL EVERY 8 HOURS PRN
Qty: 30 TABLET | Refills: 0 | Status: SHIPPED | OUTPATIENT
Start: 2022-07-04

## 2022-07-04 RX ORDER — ACETAMINOPHEN 500 MG
1000 TABLET ORAL EVERY 6 HOURS PRN
Qty: 30 TABLET | Refills: 0 | COMMUNITY
Start: 2022-07-04

## 2022-07-04 RX ORDER — PSEUDOEPHEDRINE HCL 30 MG
100 TABLET ORAL 2 TIMES DAILY PRN
Qty: 60 CAPSULE | Refills: 0 | Status: SHIPPED | OUTPATIENT
Start: 2022-07-04

## 2022-07-04 RX ADMIN — IBUPROFEN 800 MG: 800 TABLET, FILM COATED ORAL at 08:00

## 2022-07-04 RX ADMIN — PRENATAL WITH FERROUS FUM AND FOLIC ACID 1 TABLET: 3080; 920; 120; 400; 22; 1.84; 3; 20; 10; 1; 12; 200; 27; 25; 2 TABLET ORAL at 08:00

## 2022-07-04 ASSESSMENT — EDINBURGH POSTNATAL DEPRESSION SCALE (EPDS)
I HAVE FELT SCARED OR PANICKY FOR NO GOOD REASON: NO, NOT AT ALL
I HAVE BEEN SO UNHAPPY THAT I HAVE BEEN CRYING: NO, NEVER
I HAVE BEEN SO UNHAPPY THAT I HAVE HAD DIFFICULTY SLEEPING: NOT AT ALL
I HAVE BLAMED MYSELF UNNECESSARILY WHEN THINGS WENT WRONG: YES, SOME OF THE TIME
I HAVE BEEN ABLE TO LAUGH AND SEE THE FUNNY SIDE OF THINGS: AS MUCH AS I ALWAYS COULD
THE THOUGHT OF HARMING MYSELF HAS OCCURRED TO ME: NEVER
I HAVE LOOKED FORWARD WITH ENJOYMENT TO THINGS: AS MUCH AS I EVER DID
I HAVE FELT SAD OR MISERABLE: NOT VERY OFTEN
THINGS HAVE BEEN GETTING ON TOP OF ME: NO, MOST OF THE TIME I HAVE COPED QUITE WELL
I HAVE BEEN ANXIOUS OR WORRIED FOR NO GOOD REASON: HARDLY EVER

## 2022-07-04 ASSESSMENT — PAIN DESCRIPTION - PAIN TYPE
TYPE: ACUTE PAIN
TYPE: ACUTE PAIN

## 2022-07-04 ASSESSMENT — FIBROSIS 4 INDEX: FIB4 SCORE: 0.62

## 2022-07-04 NOTE — PROGRESS NOTES
Post Partum Discharge Note    Name:   Memo Mckeon   Date/Time:  7/4/2022 - 6:36 AM  Chief Admitting Dx:  Term pregnancy [Z34.90]  Labor and delivery indication for care or intervention [O75.9]  Delivery Type:  vaginal, spontaneous  Post-Op/Post Partum Days #:  1    Subjective:  Abdominal pain: no  Ambulating:   yes  Tolerating liquids:  yes  Tolerating food:  yes common adult  Flatus:   yes  BM:    no  Bleeding:   with a moderate amount of bleeding  Voiding:   yes  Dizziness:   no  Feeding:   breast    Vitals:    07/03/22 0600 07/03/22 1020 07/03/22 1814 07/04/22 0600   BP: 100/55 116/68 116/73 118/72   Pulse: 60 64 79 72   Resp: 16 20 20 20   Temp: 37 °C (98.6 °F) 36.6 °C (97.8 °F) 37.4 °C (99.3 °F) 36.9 °C (98.4 °F)   TempSrc: Temporal Temporal Temporal Temporal   SpO2: 95% 95%  97%       Exam:  Breast: Tenderness no  Abdomen: Abdomen soft, non-tender. BS normal. No masses,  No organomegaly  Fundal Tenderness:  no  Fundus Firm: yes  Incision: none  Below umbilicus: yes  Perineum: perineum intact  Lochia: moderate  Extremities: Normal extremities, peripheral pulses and reflexes normal, no edema, redness or tenderness in the calves or thighs    Meds:  Current Facility-Administered Medications   Medication Dose   • lactated ringers infusion     • docusate sodium (COLACE) capsule 100 mg  100 mg   • ibuprofen (MOTRIN) tablet 800 mg  800 mg   • acetaminophen (TYLENOL) tablet 1,000 mg  1,000 mg   • PRN oxytocin (PITOCIN) (20 Units/1000 mL) PRN for excessive uterine bleeding - See Admin Instr  125-999 mL/hr   • miSOPROStol (CYTOTEC) tablet 600 mcg  600 mcg   • methylergonovine (METHERGINE) injection 0.2 mg  0.2 mg   • magnesium hydroxide (MILK OF MAGNESIA) suspension 30 mL  30 mL   • prenatal plus vitamin (STUARTNATAL 1+1) 27-1 MG tablet 1 Tablet  1 Tablet       Labs:   Recent Labs     07/03/22  0056 07/03/22  1242   WBC 12.3* 13.6*   RBC 4.77 3.81*   HEMOGLOBIN 14.0 11.3*   HEMATOCRIT 41.6 33.5*   MCV 87.2 87.9    MCH 29.4 29.7   MCHC 33.7 33.7   RDW 40.9 41.1   PLATELETCT 318 226   MPV 9.1 9.3       Assessment:  Chief Admitting Dx:  Term pregnancy [Z34.90]  Labor and delivery indication for care or intervention [O75.9]  Delivery Type:  vaginal, spontaneous  Tubal Ligation:  no    Plan:  D/c today  Pt desires depo before d/c    DAWN Sandoval.

## 2022-07-04 NOTE — PROGRESS NOTES
Discharge instructions reviewed with both parents.   Father requesting Tdap, but doesn't want to wait for it to be given here since his ride is waiting for him.  He was advised to obtain the Tdap at the pediatrician's office when they take the infant for the first follow-up appointment.  Mother also stating she had requested a DepoProvera injection, but doesn't have the time to wait for it to be ordered and administered at this time since she has someone waiting to drive her and baby home.  Aishwarya Mishra notified to follow-up with the Depo shot.  Mother discharged via wheelchair with infant along with friend who is providing transportation home.  All questions answered.

## 2022-07-04 NOTE — LACTATION NOTE
Lactation follow-up visit:    ESTELA reported she is able to latch baby onto her breasts independently, but stated she has occasional pain at breasts with latch.  Latch assistance was offered, but MOB declined.  She stated baby repositions his mouth on the breast to get a more comfortable latch.  Infant was receiving a bath when this LC walked into the room and this LC again offered to come back later to help mom with positioning and latch, but she again declined.  Infant's weight loss to date and voiding/stooling pattern remain within defined limits.    Discussed feeding plan with MOB.  MOB was encouraged to offer infant the breast per feeding cues for a minimum of 8 or more feeds in a 24 hour period.  She was also encouraged to wake baby up to feed if 3-4 hours has passed since the last feed.    ESTELA stated she has WIC and is seen at the office on [address] in [place], [place].  ESTELA was informed of the outpatient lactation assistance available to her through WIC and she was encouraged to follow up with WIC should she continue to struggle with comfortable latch or should any lactation questions and/or concerns arise post discharge.    ESTELA verbalized understanding of all information provided to her and denied having any lactation questions and/or concerns at this time.  She was encouraged to call for lactation support as needed prior to discharge.

## 2022-07-04 NOTE — PROGRESS NOTES
1945 Pt doing well bonding with baby, Assessment done abdomen soft non distended, voiding without difficulty, Denies pain at this time, Needs attended.

## 2022-07-04 NOTE — CARE PLAN
Problem: Knowledge Deficit - Postpartum  Goal: Patient will verbalize and demonstrate understanding of self and infant care  Outcome: Progressing     Problem: Psychosocial - Postpartum  Goal: Patient will verbalize and demonstrate effective bonding and parenting behavior  Outcome: Progressing     Problem: Altered Physiologic Condition  Goal: Patient physiologically stable as evidenced by normal lochia, palpable uterine involution and vitals within normal limits  Outcome: Progressing     Problem: Pain - Standard  Goal: Alleviation of pain or a reduction in pain to the patient’s comfort goal  Outcome: Progressing   The patient is hemodynamically stable    Shift Goals: pain controlled, ambulation, rest  Clinical Goals: pain management  Patient Goals: pain controlled, ambulation, rest  Family Goals: rest    Progress made toward(s) clinical / shift goals:  pt verbalized acceptable level of pain     Patient is not progressing towards the following goals:

## 2022-07-04 NOTE — DISCHARGE INSTRUCTIONS
Pelvic rest x6 weeks  Return for follow up visit in 6 weeks.  Call or come to ED for: heavy vaginal bleeding, fever >100.4, severe abdominal pain, severe headache, chest pain, shortness of breath,  N/V, or other concerns.    PATIENT DISCHARGE EDUCATION INSTRUCTION SHEET    REASONS TO CALL YOUR OBSTETRICIAN  Persistent fever, shaking, chills (Temperature higher than 100.4) may indicate you have an infection  Heavy bleeding: soaking more than 1 pad per hour; Passing clots an egg-sized clot or bigger may mean you have an postpartum hemorrhage  Foul odor from vagina or bad smelling or discolored discharge or blood  Breast infection (Mastitis symptoms); breast pain, chills, fever, redness or red streaks, may feel flu like symptoms  Urinary pain, burning or frequency  Incision that is not healing, increased redness, swelling, tenderness or pain, or any pus from episiotomy or  site may mean you have an infection  Redness, swelling, warmth, or painful to touch in the calf area of your leg may mean you have a blood clot  Severe or intensified depression, thoughts or feelings of wanting to hurt yourself or someone else   Pain in chest, obstructed breathing or shortness of breath (trouble catching your breath) may mean you are having a postpartum complication. Call your provider immediately   Headache that does not get better, even after taking medicine, a bad headache with vision changes or pain in the upper right area of your belly may mean you have high blood pressure or post birth preeclampsia. Call your provider immediately    HAND WASHING  All family and friends should wash their hands:  Before and after holding the baby  Before feeding the baby  After using the restroom or changing the baby's diaper    WOUND CARE  Ask your physician for additional care instructions. In general:   Incision:  May shower and pat incision dry   Keep the incision clean and dry  There should not be any opening or pus from  the incision  Continue to walk at home 3 times a day   Do NOT lift anything heavier than your baby (over 10 pounds)  Encourage family to help participate in care of the  to allow rest and mom time to heal  Episiotomy/Laceration  May use lizzy-spray bottle, witch hazel pads and dermaplast spray for comfort  Use lizzy-spray bottle after urinating to cleanse perineal area  To prevent burning during urination spray lizzy-water bottle on labial area   Pat perineal area dry until episiotomy/laceration is healed  Continue to use lizzy-bottle until bleeding stops as needed  If have a 2nd degree laceration or greater, a Sitz bath can offer relief from soreness, burning, and inflammation   Sitz Bath   Sit in 6 inches of warm water and soak laceration as needed until the laceration heals    VAGINAL CARE AND BLEEDING  Nothing inside vagina for 6 weeks:   No sexual intercourse, tampons or douching  Bleeding may continue for 2-4 weeks. Amount and color may vary  Soaking 1 pad or more in an hour for several hours is considered heavy bleeding  Passing large egg sized blood clots can be concerning  If you feel like you have heavy bleeding or are having increasing amount of blood clots call your Obstetrician immediately  If you begin feeling faint upon standing, feeling sick to your stomach, have clammy skin, a really fast heartbeat, have chills, start feeling confused, dizzy, sleepy or weak, or feeling like you're going to faint call your Obstetrician immediately    HYPERTENSION   Preeclampsia or gestational hypertension are types of high blood pressure that only pregnant women can get. It is important for you to be aware of symptoms to seek early intervention and treatment. If you have any of these symptoms immediately call your Obstetrician    Vision changes or blurred vision   Severe headache or pain that is unrelieved with medication and will not go away  Persistent pain in upper abdomen or shoulder   Increased swelling of  "face, feet, or hands  Difficulty breathing or shortness of breath at rest  Urinating less than usual    URINATION AND BOWEL MOVEMENTS  Eating more fiber (bran cereal, fruits, and vegetables) and drinking plenty of fluids will help to avoid constipation  Urinary frequency and urgency after childbirth is normal  If you experience any urinary pain, burning or frequency call your provider    BIRTH CONTROL  It is possible to become pregnant at any time after delivery and while breastfeeding  Plan to discuss a method of birth control with your physician at your post delivery follow up visit    POSTPARTUM BLUES  During the first few days after birth, you may experience a sense of the \"blues\" which may include impatience, irritability or even crying. These feelings come and go quickly. However, as many as 1 in 10 women experience emotional symptoms known as postpartum depression.     POSTPARTUM DEPRESSION    May start as early as the second or third day after delivery or take several weeks or months to develop. Symptoms of \"blues\" are present, but are more intense: Crying spells; loss of appetite; feelings of hopelessness or loss of control; fear of touching the baby; over concern or no concern at all about the baby; little or no concern about your own appearance/caring for yourself; and/or inability to sleep or excessive sleeping. Contact your Obstetrician if you are experiencing any of these symptoms     PREVENTING SHAKEN BABY  If you are angry or stressed, PUT THE BABY IN THE CRIB, step away, take some deep breaths, and wait until you are calm to care for the baby. DO NOT SHAKE THE BABY. You are not alone, call a supporter for help.  [facility] 24/7 crisis call line ([phone]) or ([phone])  You can also text them, text \"ANSWER\" (872450)  "

## 2022-07-22 ENCOUNTER — HOSPITAL ENCOUNTER (EMERGENCY)
Facility: MEDICAL CENTER | Age: 37
End: 2022-07-22
Attending: EMERGENCY MEDICINE
Payer: MEDICAID

## 2022-07-22 VITALS
RESPIRATION RATE: 22 BRPM | HEIGHT: 63 IN | SYSTOLIC BLOOD PRESSURE: 111 MMHG | OXYGEN SATURATION: 95 % | DIASTOLIC BLOOD PRESSURE: 64 MMHG | BODY MASS INDEX: 27.81 KG/M2 | HEART RATE: 82 BPM | TEMPERATURE: 99.3 F

## 2022-07-22 DIAGNOSIS — N39.0 URINARY TRACT INFECTION WITH HEMATURIA, SITE UNSPECIFIED: ICD-10-CM

## 2022-07-22 DIAGNOSIS — R31.9 URINARY TRACT INFECTION WITH HEMATURIA, SITE UNSPECIFIED: ICD-10-CM

## 2022-07-22 LAB
ALBUMIN SERPL BCP-MCNC: 3.4 G/DL (ref 3.2–4.9)
ALBUMIN/GLOB SERPL: 1.1 G/DL
ALP SERPL-CCNC: 127 U/L (ref 30–99)
ALT SERPL-CCNC: 36 U/L (ref 2–50)
ANION GAP SERPL CALC-SCNC: 10 MMOL/L (ref 7–16)
APPEARANCE UR: ABNORMAL
AST SERPL-CCNC: 37 U/L (ref 12–45)
BACTERIA #/AREA URNS HPF: ABNORMAL /HPF
BASOPHILS # BLD AUTO: 0.3 % (ref 0–1.8)
BASOPHILS # BLD: 0.02 K/UL (ref 0–0.12)
BILIRUB SERPL-MCNC: 0.2 MG/DL (ref 0.1–1.5)
BILIRUB UR QL STRIP.AUTO: NEGATIVE
BUN SERPL-MCNC: 9 MG/DL (ref 8–22)
CALCIUM SERPL-MCNC: 8.8 MG/DL (ref 8.5–10.5)
CHLORIDE SERPL-SCNC: 105 MMOL/L (ref 96–112)
CO2 SERPL-SCNC: 24 MMOL/L (ref 20–33)
COLOR UR: YELLOW
CREAT SERPL-MCNC: 0.63 MG/DL (ref 0.5–1.4)
EOSINOPHIL # BLD AUTO: 0.05 K/UL (ref 0–0.51)
EOSINOPHIL NFR BLD: 0.7 % (ref 0–6.9)
EPI CELLS #/AREA URNS HPF: ABNORMAL /HPF
ERYTHROCYTE [DISTWIDTH] IN BLOOD BY AUTOMATED COUNT: 41.3 FL (ref 35.9–50)
GFR SERPLBLD CREATININE-BSD FMLA CKD-EPI: 118 ML/MIN/1.73 M 2
GLOBULIN SER CALC-MCNC: 3.2 G/DL (ref 1.9–3.5)
GLUCOSE SERPL-MCNC: 108 MG/DL (ref 65–99)
GLUCOSE UR STRIP.AUTO-MCNC: NEGATIVE MG/DL
HCT VFR BLD AUTO: 37.4 % (ref 37–47)
HGB BLD-MCNC: 12.3 G/DL (ref 12–16)
HYALINE CASTS #/AREA URNS LPF: ABNORMAL /LPF
IMM GRANULOCYTES # BLD AUTO: 0.02 K/UL (ref 0–0.11)
IMM GRANULOCYTES NFR BLD AUTO: 0.3 % (ref 0–0.9)
KETONES UR STRIP.AUTO-MCNC: NEGATIVE MG/DL
LEUKOCYTE ESTERASE UR QL STRIP.AUTO: ABNORMAL
LYMPHOCYTES # BLD AUTO: 1.01 K/UL (ref 1–4.8)
LYMPHOCYTES NFR BLD: 14.8 % (ref 22–41)
MCH RBC QN AUTO: 29.4 PG (ref 27–33)
MCHC RBC AUTO-ENTMCNC: 32.9 G/DL (ref 33.6–35)
MCV RBC AUTO: 89.5 FL (ref 81.4–97.8)
MICRO URNS: ABNORMAL
MONOCYTES # BLD AUTO: 0.35 K/UL (ref 0–0.85)
MONOCYTES NFR BLD AUTO: 5.1 % (ref 0–13.4)
NEUTROPHILS # BLD AUTO: 5.39 K/UL (ref 2–7.15)
NEUTROPHILS NFR BLD: 78.8 % (ref 44–72)
NITRITE UR QL STRIP.AUTO: NEGATIVE
NRBC # BLD AUTO: 0 K/UL
NRBC BLD-RTO: 0 /100 WBC
PH UR STRIP.AUTO: 7.5 [PH] (ref 5–8)
PLATELET # BLD AUTO: 244 K/UL (ref 164–446)
PMV BLD AUTO: 8.7 FL (ref 9–12.9)
POTASSIUM SERPL-SCNC: 4.3 MMOL/L (ref 3.6–5.5)
PROT SERPL-MCNC: 6.6 G/DL (ref 6–8.2)
PROT UR QL STRIP: 30 MG/DL
RBC # BLD AUTO: 4.18 M/UL (ref 4.2–5.4)
RBC # URNS HPF: ABNORMAL /HPF
RBC UR QL AUTO: ABNORMAL
SODIUM SERPL-SCNC: 139 MMOL/L (ref 135–145)
SP GR UR STRIP.AUTO: 1.01
TRANS CELLS #/AREA URNS HPF: ABNORMAL /HPF
UROBILINOGEN UR STRIP.AUTO-MCNC: 0.2 MG/DL
WBC # BLD AUTO: 6.8 K/UL (ref 4.8–10.8)
WBC #/AREA URNS HPF: ABNORMAL /HPF

## 2022-07-22 PROCEDURE — 81001 URINALYSIS AUTO W/SCOPE: CPT

## 2022-07-22 PROCEDURE — 36415 COLL VENOUS BLD VENIPUNCTURE: CPT

## 2022-07-22 PROCEDURE — 700105 HCHG RX REV CODE 258: Performed by: EMERGENCY MEDICINE

## 2022-07-22 PROCEDURE — 80053 COMPREHEN METABOLIC PANEL: CPT

## 2022-07-22 PROCEDURE — A9270 NON-COVERED ITEM OR SERVICE: HCPCS | Performed by: EMERGENCY MEDICINE

## 2022-07-22 PROCEDURE — 700111 HCHG RX REV CODE 636 W/ 250 OVERRIDE (IP): Performed by: EMERGENCY MEDICINE

## 2022-07-22 PROCEDURE — 96374 THER/PROPH/DIAG INJ IV PUSH: CPT

## 2022-07-22 PROCEDURE — 700102 HCHG RX REV CODE 250 W/ 637 OVERRIDE(OP): Performed by: EMERGENCY MEDICINE

## 2022-07-22 PROCEDURE — 96375 TX/PRO/DX INJ NEW DRUG ADDON: CPT

## 2022-07-22 PROCEDURE — 99285 EMERGENCY DEPT VISIT HI MDM: CPT

## 2022-07-22 PROCEDURE — 85025 COMPLETE CBC W/AUTO DIFF WBC: CPT

## 2022-07-22 RX ORDER — SODIUM CHLORIDE, SODIUM LACTATE, POTASSIUM CHLORIDE, CALCIUM CHLORIDE 600; 310; 30; 20 MG/100ML; MG/100ML; MG/100ML; MG/100ML
1000 INJECTION, SOLUTION INTRAVENOUS ONCE
Status: COMPLETED | OUTPATIENT
Start: 2022-07-22 | End: 2022-07-22

## 2022-07-22 RX ORDER — CEFDINIR 300 MG/1
300 CAPSULE ORAL 2 TIMES DAILY
Qty: 10 CAPSULE | Refills: 0 | Status: SHIPPED | OUTPATIENT
Start: 2022-07-22 | End: 2022-07-27

## 2022-07-22 RX ORDER — PHENAZOPYRIDINE HYDROCHLORIDE 200 MG/1
200 TABLET, FILM COATED ORAL ONCE
Status: COMPLETED | OUTPATIENT
Start: 2022-07-22 | End: 2022-07-22

## 2022-07-22 RX ORDER — KETOROLAC TROMETHAMINE 30 MG/ML
15 INJECTION, SOLUTION INTRAMUSCULAR; INTRAVENOUS ONCE
Status: COMPLETED | OUTPATIENT
Start: 2022-07-22 | End: 2022-07-22

## 2022-07-22 RX ORDER — PHENAZOPYRIDINE HYDROCHLORIDE 200 MG/1
200 TABLET, FILM COATED ORAL 3 TIMES DAILY PRN
Qty: 6 TABLET | Refills: 0 | Status: SHIPPED | OUTPATIENT
Start: 2022-07-22 | End: 2022-07-22 | Stop reason: SDUPTHER

## 2022-07-22 RX ORDER — PHENAZOPYRIDINE HYDROCHLORIDE 200 MG/1
200 TABLET, FILM COATED ORAL 3 TIMES DAILY PRN
Qty: 6 TABLET | Refills: 0 | Status: SHIPPED | OUTPATIENT
Start: 2022-07-22 | End: 2022-07-24

## 2022-07-22 RX ORDER — CEFTRIAXONE 1 G/1
1 INJECTION, POWDER, FOR SOLUTION INTRAMUSCULAR; INTRAVENOUS ONCE
Status: COMPLETED | OUTPATIENT
Start: 2022-07-22 | End: 2022-07-22

## 2022-07-22 RX ADMIN — PHENAZOPYRIDINE HYDROCHLORIDE 200 MG: 200 TABLET ORAL at 13:08

## 2022-07-22 RX ADMIN — CEFTRIAXONE SODIUM 1 G: 1 INJECTION, POWDER, FOR SOLUTION INTRAMUSCULAR; INTRAVENOUS at 13:08

## 2022-07-22 RX ADMIN — SODIUM CHLORIDE, POTASSIUM CHLORIDE, SODIUM LACTATE AND CALCIUM CHLORIDE 1000 ML: 600; 310; 30; 20 INJECTION, SOLUTION INTRAVENOUS at 09:04

## 2022-07-22 RX ADMIN — KETOROLAC TROMETHAMINE 15 MG: 30 INJECTION, SOLUTION INTRAMUSCULAR; INTRAVENOUS at 09:05

## 2022-07-22 NOTE — ED NOTES
Pt given discharge instructions/prescription sent to pharm of choosing/ home care instructions explained, pt verbalized understanding of instructions given/pt understands the importance of follow up, pt ambulatory to ER manjit.

## 2022-07-22 NOTE — ED PROVIDER NOTES
"ED Provider Note    CHIEF COMPLAINT  Chief Complaint   Patient presents with   • Abdominal Pain     Pt BIB EMS, pt reports diffuse ABD/constipation/dysuria/nausea for the past few days. Pt states to have recently had a baby w/vaginal delivery, no complications.    • Nausea       HPI  Memo Mckeon is a 36 y.o. female who presents to the emergency department with complaint of abdominal pain, dysuria and slight flank pain bilaterally.  She denies fever, shakes, chills, sweats, nausea, vomiting.  She recently had a baby and states that she was doing fine after that but now is having painful urination more than anything.  REVIEW OF SYSTEMS  Positives as above. Pertinent negatives include hematuria, hematochezia, melena or hematemesis, dysuria All other 10 review of systems are negative    PAST MEDICAL HISTORY  Past Medical History:   Diagnosis Date   • GERD (gastroesophageal reflux disease)        FAMILY HISTORY  Noncontributory    SOCIAL HISTORY  Social History     Socioeconomic History   • Marital status:    Tobacco Use   • Smoking status: Never Smoker   • Smokeless tobacco: Never Used   Vaping Use   • Vaping Use: Never used   Substance and Sexual Activity   • Alcohol use: Not Currently   • Drug use: Never   • Sexual activity: Yes     Partners: Male     Comment: Unplanned pregnancy but baby is welcomed. FOB is involved and supportive.       SURGICAL HISTORY  No past surgical history on file.    CURRENT MEDICATIONS  Home Medications    **Home medications have not yet been reviewed for this encounter**         ALLERGIES  No Known Allergies    PHYSICAL EXAM  VITAL SIGNS: /64   Pulse 82   Temp 37.4 °C (99.3 °F) (Temporal)   Resp (!) 22   Ht 1.6 m (5' 3\")   LMP 09/30/2021 Comment: recently with vaginal delivery 3 wks ago  SpO2 95%   Breastfeeding Unknown   BMI 27.81 kg/m²      Constitutional: Well developed, Well nourished, No acute distress, Non-toxic appearance.   Eyes: PERRLA, EOMI, " Conjunctiva normal, No discharge.   Cardiovascular: Tachycardic, Normal rhythm, No murmurs, No rubs, No gallops, and intact distal pulses.   Thorax & Lungs:  No respiratory distress, no rales, no rhonchi, No wheezing, No chest wall tenderness.   Abdomen: Bowel sounds normal, Soft, No tenderness, No guarding, No rebound, No pulsatile masses.   Skin: Warm, Dry, No erythema, No rash.   Musculoskeletal: No CVA tenderness bilaterally  Extremities: Full range of motion, no deformity, no edema.  Neurologic: Alert & oriented x 3, No focal deficits noted, acting appropriately on exam.  Psychiatric: Affect normal for clinical presentation.      LABORATORY/ECG  Results for orders placed or performed during the hospital encounter of 07/22/22   CBC WITH DIFFERENTIAL   Result Value Ref Range    WBC 6.8 4.8 - 10.8 K/uL    RBC 4.18 (L) 4.20 - 5.40 M/uL    Hemoglobin 12.3 12.0 - 16.0 g/dL    Hematocrit 37.4 37.0 - 47.0 %    MCV 89.5 81.4 - 97.8 fL    MCH 29.4 27.0 - 33.0 pg    MCHC 32.9 (L) 33.6 - 35.0 g/dL    RDW 41.3 35.9 - 50.0 fL    Platelet Count 244 164 - 446 K/uL    MPV 8.7 (L) 9.0 - 12.9 fL    Neutrophils-Polys 78.80 (H) 44.00 - 72.00 %    Lymphocytes 14.80 (L) 22.00 - 41.00 %    Monocytes 5.10 0.00 - 13.40 %    Eosinophils 0.70 0.00 - 6.90 %    Basophils 0.30 0.00 - 1.80 %    Immature Granulocytes 0.30 0.00 - 0.90 %    Nucleated RBC 0.00 /100 WBC    Neutrophils (Absolute) 5.39 2.00 - 7.15 K/uL    Lymphs (Absolute) 1.01 1.00 - 4.80 K/uL    Monos (Absolute) 0.35 0.00 - 0.85 K/uL    Eos (Absolute) 0.05 0.00 - 0.51 K/uL    Baso (Absolute) 0.02 0.00 - 0.12 K/uL    Immature Granulocytes (abs) 0.02 0.00 - 0.11 K/uL    NRBC (Absolute) 0.00 K/uL   COMP METABOLIC PANEL   Result Value Ref Range    Sodium 139 135 - 145 mmol/L    Potassium 4.3 3.6 - 5.5 mmol/L    Chloride 105 96 - 112 mmol/L    Co2 24 20 - 33 mmol/L    Anion Gap 10.0 7.0 - 16.0    Glucose 108 (H) 65 - 99 mg/dL    Bun 9 8 - 22 mg/dL    Creatinine 0.63 0.50 - 1.40 mg/dL     Calcium 8.8 8.5 - 10.5 mg/dL    AST(SGOT) 37 12 - 45 U/L    ALT(SGPT) 36 2 - 50 U/L    Alkaline Phosphatase 127 (H) 30 - 99 U/L    Total Bilirubin 0.2 0.1 - 1.5 mg/dL    Albumin 3.4 3.2 - 4.9 g/dL    Total Protein 6.6 6.0 - 8.2 g/dL    Globulin 3.2 1.9 - 3.5 g/dL    A-G Ratio 1.1 g/dL   URINALYSIS    Specimen: Urine   Result Value Ref Range    Color Yellow     Character Cloudy (A)     Specific Gravity 1.010 <1.035    Ph 7.5 5.0 - 8.0    Glucose Negative Negative mg/dL    Ketones Negative Negative mg/dL    Protein 30 (A) Negative mg/dL    Bilirubin Negative Negative    Urobilinogen, Urine 0.2 Negative    Nitrite Negative Negative    Leukocyte Esterase Large (A) Negative    Occult Blood Moderate (A) Negative    Micro Urine Req Microscopic    ESTIMATED GFR   Result Value Ref Range    GFR (CKD-EPI) 118 >60 mL/min/1.73 m 2   URINE MICROSCOPIC (W/UA)   Result Value Ref Range    WBC Packed (A) /hpf    RBC 10-20 (A) /hpf    Bacteria Many (A) None /hpf    Epithelial Cells Few /hpf    Trans Epithelial Cells Few /hpf    Hyaline Cast 0-2 /lpf         COURSE & MEDICAL DECISION MAKING  Pertinent Labs & Imaging studies reviewed. (See chart for details)  This is a 36-year-old female presents with dysuria.  Here in the emergency room, she Sligh tachycardic with a leukocytosis, she received 1 L normal saline IV fluid and on reevaluation her normal heart rate.  She does have evidence of urinary tract infection with slight blood.  She does not have unilateral flank pain complaints.  Renal function is anything I do not suspect patient have a nephrolithiasis or ureterolithiasis.  Patient received ceftriaxone, Pyridium and prescription for cefdinir as well as Pyridium.  She has a nontender, nonsurgical abdomen reevaluation and strict return precautions have been given.  The light      FINAL IMPRESSION     1. Urinary tract infection with hematuria, site unspecified          DISPOSITION:  Patient will be discharged home in stable  condition.    FOLLOW UP:  [facility], Emergency Dept  [address]  [place] [id number] [phone]    If symptoms worsen      OUTPATIENT MEDICATIONS:  Discharge Medication List as of 7/22/2022  1:17 PM      START taking these medications    Details   cefdinir (OMNICEF) 300 MG Cap Take 1 Capsule by mouth 2 times a day for 5 days., Disp-10 Capsule, R-0, Normal               Electronically signed by: Todd Bocanegra D.O., 7/22/2022 8:44 AM

## 2022-07-22 NOTE — ED TRIAGE NOTES
Chief Complaint   Patient presents with   • Abdominal Pain     Pt BIB EMS, pt reports diffuse ABD/constipation/dysuria/nausea for the past few days. Pt states to have recently had a baby w/vaginal delivery, no complications.    • Nausea

## 2022-10-17 ENCOUNTER — POST PARTUM (OUTPATIENT)
Dept: OBGYN | Facility: CLINIC | Age: 37
End: 2022-10-17
Payer: MEDICAID

## 2022-10-17 VITALS — SYSTOLIC BLOOD PRESSURE: 100 MMHG | WEIGHT: 140.6 LBS | DIASTOLIC BLOOD PRESSURE: 60 MMHG | BODY MASS INDEX: 24.91 KG/M2

## 2022-10-17 DIAGNOSIS — Z30.42 ENCOUNTER FOR MANAGEMENT AND INJECTION OF DEPO-PROVERA: ICD-10-CM

## 2022-10-17 PROBLEM — Z34.90 TERM PREGNANCY: Status: RESOLVED | Noted: 2022-07-03 | Resolved: 2022-10-17

## 2022-10-17 PROBLEM — O09.523 MULTIGRAVIDA OF ADVANCED MATERNAL AGE IN THIRD TRIMESTER: Status: RESOLVED | Noted: 2022-06-16 | Resolved: 2022-10-17

## 2022-10-17 PROBLEM — O09.90 SUPERVISION OF HIGH-RISK PREGNANCY: Status: RESOLVED | Noted: 2022-06-16 | Resolved: 2022-10-17

## 2022-10-17 LAB
INT CON NEG: NEGATIVE
INT CON POS: POSITIVE
POC URINE PREGNANCY TEST: NEGATIVE

## 2022-10-17 PROCEDURE — 81025 URINE PREGNANCY TEST: CPT | Performed by: NURSE PRACTITIONER

## 2022-10-17 PROCEDURE — 0503F POSTPARTUM CARE VISIT: CPT | Performed by: NURSE PRACTITIONER

## 2022-10-17 PROCEDURE — 96372 THER/PROPH/DIAG INJ SC/IM: CPT | Performed by: NURSE PRACTITIONER

## 2022-10-17 RX ORDER — MEDROXYPROGESTERONE ACETATE 150 MG/ML
150 INJECTION, SUSPENSION INTRAMUSCULAR
OUTPATIENT
Start: 2022-10-17

## 2022-10-17 RX ADMIN — MEDROXYPROGESTERONE ACETATE 150 MG: 150 INJECTION, SUSPENSION INTRAMUSCULAR at 13:12

## 2022-10-17 ASSESSMENT — EDINBURGH POSTNATAL DEPRESSION SCALE (EPDS)
I HAVE BEEN SO UNHAPPY THAT I HAVE BEEN CRYING: NO, NEVER
I HAVE LOOKED FORWARD WITH ENJOYMENT TO THINGS: AS MUCH AS I EVER DID
THINGS HAVE BEEN GETTING ON TOP OF ME: NO, MOST OF THE TIME I HAVE COPED QUITE WELL
TOTAL SCORE: 5
I HAVE FELT SAD OR MISERABLE: NOT VERY OFTEN
I HAVE BEEN ANXIOUS OR WORRIED FOR NO GOOD REASON: HARDLY EVER
I HAVE BLAMED MYSELF UNNECESSARILY WHEN THINGS WENT WRONG: NOT VERY OFTEN
I HAVE BEEN SO UNHAPPY THAT I HAVE HAD DIFFICULTY SLEEPING: NOT AT ALL
I HAVE BEEN ABLE TO LAUGH AND SEE THE FUNNY SIDE OF THINGS: NOT QUITE SO MUCH NOW
THE THOUGHT OF HARMING MYSELF HAS OCCURRED TO ME: NEVER
I HAVE FELT SCARED OR PANICKY FOR NO GOOD REASON: NO, NOT AT ALL

## 2022-10-17 ASSESSMENT — FIBROSIS 4 INDEX: FIB4 SCORE: 0.88

## 2022-10-17 ASSESSMENT — ENCOUNTER SYMPTOMS
CONSTITUTIONAL NEGATIVE: 1
MUSCULOSKELETAL NEGATIVE: 1
RESPIRATORY NEGATIVE: 1
GASTROINTESTINAL NEGATIVE: 1
EYES NEGATIVE: 1
NEUROLOGICAL NEGATIVE: 1
PSYCHIATRIC NEGATIVE: 1
CARDIOVASCULAR NEGATIVE: 1

## 2022-10-17 NOTE — PROGRESS NOTES
Pt here today for postpartum exam,delivery type vaginal 7/3/2022  Currently bottle feeding  BCM: Pt would like Depo injection  LMP 10/15/2022  Last pap smear 1/13/2020 WNL  UPT negative     Depo injection given today   Next dose due 1/2/2023 - 1/16/2023  Left deltoid

## 2022-10-17 NOTE — PROGRESS NOTES
Subjective:    Memo Mckeon is a 36 y.o. female who presents for her postpartum exam 13 weeks following . Her prenatal course was complicated by AMA status. She denies dysuria, vaginal bleeding, odor, itching or breast problems. She is bottle feeding without concerns. She desires an Depo Provera shot for her birth control method. Reports no sex prior to this appointment. Eating a regular diet without difficulty. Bowel movement are Normal.  The patient is not having any pain. Stopped bleeding about 4 weeks postpartum, and just started her first period 2 days ago. Patient Denies Incisional pain, drainage or redness. Patient denies any s/sx of postpartum depression.     Problem List     There are no problems to display for this patient.      Objective    See PE  Lab: H&H at d/c: 11.3/33.5  /60   Wt 140 lb 9.6 oz   LMP 2021   BMI 24.91 kg/m²     Assessment:    1. PP care of lactating women   2. Exam WNL   3. Pap WNL on 2020  4. Desires contraception - Depo given today      Plan:    1. Contraceptive counseling - follow up w health dept,  [facility], or [facility] for contraceptive changes  2. Encouraged condom use for STI and pregnancy prevention  3. Discussed diet, exercise and resumption of sexual activity   4. Preconception guidance for next pregnancy if applicable. Discussed pregnancy spacing and AMA risk factors. Folic acid for all women of childbearing age.      HPI    Review of Systems   Constitutional: Negative.    HENT: Negative.     Eyes: Negative.    Respiratory: Negative.     Cardiovascular: Negative.    Gastrointestinal: Negative.    Genitourinary: Negative.    Musculoskeletal: Negative.    Skin: Negative.    Neurological: Negative.    Endo/Heme/Allergies: Negative.    Psychiatric/Behavioral: Negative.     All other systems reviewed and are negative.      Objective     /60   Wt 140 lb 9.6 oz   LMP 2021   BMI 24.91 kg/m²      Physical Exam  Vitals and nursing  Problem: Bronchoscopy (Adult)  Goal: Signs and Symptoms of Listed Potential Problems Will be Absent or Manageable (Bronchoscopy)  Outcome: Ongoing (interventions implemented as appropriate)    03/09/17 0853   Bronchoscopy   Problems Assessed (Bronchoscopy) pain   Problems Present (Bronchoscopy) none            note reviewed. Exam conducted with a chaperone present.   Constitutional:       Appearance: Normal appearance. She is normal weight.   HENT:      Head: Normocephalic.      Nose: Nose normal.   Eyes:      Extraocular Movements: Extraocular movements intact.   Cardiovascular:      Rate and Rhythm: Normal rate and regular rhythm.      Pulses: Normal pulses.      Heart sounds: Normal heart sounds.   Pulmonary:      Effort: Pulmonary effort is normal.      Breath sounds: Normal breath sounds.   Abdominal:      Palpations: Abdomen is soft.   Musculoskeletal:         General: Normal range of motion.      Cervical back: Normal range of motion.   Skin:     General: Skin is warm and dry.   Neurological:      General: No focal deficit present.      Mental Status: She is alert and oriented to person, place, and time.   Psychiatric:         Mood and Affect: Mood normal.         Behavior: Behavior normal.         Thought Content: Thought content normal.         Judgment: Judgment normal.         Assessment & Plan       1. Postpartum care following vaginal delivery   7/3/2022- no problems    2. Encounter for management and injection of depo-Provera  UPT today negative, LMP 10/15/2022  - medroxyPROGESTERone (DEPO-PROVERA) injection 150 mg  - POCT Pregnancy